# Patient Record
Sex: FEMALE
[De-identification: names, ages, dates, MRNs, and addresses within clinical notes are randomized per-mention and may not be internally consistent; named-entity substitution may affect disease eponyms.]

---
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
## 2019-07-20 ENCOUNTER — HOSPITAL ENCOUNTER (INPATIENT)
Dept: HOSPITAL 25 - ED | Age: 19
LOS: 20 days | Discharge: HOME | DRG: 750 | End: 2019-08-09
Attending: PSYCHIATRY & NEUROLOGY | Admitting: PSYCHIATRY & NEUROLOGY
Payer: MEDICAID

## 2019-07-20 DIAGNOSIS — Z81.8: ICD-10-CM

## 2019-07-20 DIAGNOSIS — F20.9: Primary | ICD-10-CM

## 2019-07-20 DIAGNOSIS — Z78.1: ICD-10-CM

## 2019-07-20 DIAGNOSIS — Z62.812: ICD-10-CM

## 2019-07-20 PROCEDURE — 81015 MICROSCOPIC EXAM OF URINE: CPT

## 2019-07-20 PROCEDURE — 80053 COMPREHEN METABOLIC PANEL: CPT

## 2019-07-20 PROCEDURE — 84443 ASSAY THYROID STIM HORMONE: CPT

## 2019-07-20 PROCEDURE — 99232 SBSQ HOSP IP/OBS MODERATE 35: CPT

## 2019-07-20 PROCEDURE — 99222 1ST HOSP IP/OBS MODERATE 55: CPT

## 2019-07-20 PROCEDURE — 83036 HEMOGLOBIN GLYCOSYLATED A1C: CPT

## 2019-07-20 PROCEDURE — 81003 URINALYSIS AUTO W/O SCOPE: CPT

## 2019-07-20 PROCEDURE — 99233 SBSQ HOSP IP/OBS HIGH 50: CPT

## 2019-07-20 PROCEDURE — 99285 EMERGENCY DEPT VISIT HI MDM: CPT

## 2019-07-20 PROCEDURE — 85025 COMPLETE CBC W/AUTO DIFF WBC: CPT

## 2019-07-20 PROCEDURE — 99238 HOSP IP/OBS DSCHRG MGMT 30/<: CPT

## 2019-07-20 PROCEDURE — 84702 CHORIONIC GONADOTROPIN TEST: CPT

## 2019-07-20 PROCEDURE — 36415 COLL VENOUS BLD VENIPUNCTURE: CPT

## 2019-07-20 PROCEDURE — 80307 DRUG TEST PRSMV CHEM ANLYZR: CPT

## 2019-07-20 PROCEDURE — 80061 LIPID PANEL: CPT

## 2019-07-20 PROCEDURE — 99231 SBSQ HOSP IP/OBS SF/LOW 25: CPT

## 2019-07-20 NOTE — ED
Psychiatric Complaint





- HPI Summary


HPI Summary: 


This patient is a 18 year old female presenting to [facility] with a chief complaint 

of altered mental status reported by her aunt and uncle accompanying her. They 

state that she is not acting her normal self. They state the patient is acting 

confused and not responding appropriately. They state the pt is fearful of 

demons. They also state that pt has not been sleeping. The patient is 

uncooperative in the ED and keeps asking how long she will be here. She is 

being seen in the conference room and refuses to enter the patient room in the 

main ED. She says "I want water here" in the waiting room and does not want to 

go to the patient room with water. She has ripped off her name benedicto 

multiple times. When explaining simple directives she states "What are you 

talking about?". Patient "wasn't ready" and resisted going to her room. The 

patient denies any medical or surgical Hx. Her aunt states she states she was 

talking non-stop last night, and states there was a demon and she was trying to 

get it out. She repeatedly was stating "I am not going to kill myself". She was 

referring to herself as "Us". Patient became agitated. Two nurses, two security 

members, an ED tech, and Dr. Cardona all attempted to get pt to the ED room. Pt 

attempted to elope several times. Pt was aggressive and calling staff names. Pt 

was carried to the ED room, where she was medicated for her safety and staff's 

safety. Pt's aunt states the patient's mother has a Hx of schizophrenia but the 

patient has never had an episode before. Patient will be placed on 1:1 arm's 

reach watch. Patient is an elopement risk. 








LEVEL 5 Caveat: HPI Limited due to patient altered mental status. 





- History Of Current Complaint


Chief Complaint: EDMentalHealth


Hx Obtained From: Patient, Family/Caretaker - aunt and uncle


Onset/Duration: Gradual Onset


Timing: Constant


Severity Initially: Severe


Severity Currently: Severe


Character: Manic, Fearful, Anxious


Aggravating Factor(s): Recent Stress - relationship break up


Alleviating Factor(s): Nothing


Associated Signs And Symptoms: Positive: Hostile - at times to staff, Confused, 

Hallucinating - seeing demons





- Allergies/Home Medications


Allergies/Adverse Reactions: 


 Allergies











Allergy/AdvReac Type Severity Reaction Status Date / Time


 


No Known Allergies Allergy   Verified 07/20/19 08:16














PMH/Surg Hx/FS Hx/Imm Hx


Previously Healthy: Yes





- Surgical History


Surgical History: None


Infectious Disease History: No


Infectious Disease History: 


   Denies: Traveled Outside the [place] in Last 30 Days





- Family History


Known Family History: Positive: Other - Schizophrenia, mother


   Negative: Cardiac Disease, Hypertension, Diabetes





- Social History


Alcohol Use: None


Substance Use Type: Reports: None


Smoking Status (MU): Never Smoked Tobacco





- Additional Comments


History Additional Comments: 





LEVEL 5 Caveat: PMH Limited due to patient altered mental status. 





Review of Systems


Constitutional: Negative


Cardiovascular: Negative


Respiratory: Negative


Gastrointestinal: Negative


Positive: no symptoms reported


Skin: Negative


Neurological: Negative


Positive: Other - agitated, asks repeated questions, attempts to elope, 


All Other Systems Reviewed And Are Negative: No





- Comments


Additional Review of Systems Comments: 





LEVEL 5 Caveat: ROS Limited due to patient altered mental status. 





Physical Exam





- Summary


Physical Exam Summary: 


Appearance: Well-appearing, no pain distress, well-nourished


Skin: Warm, color reflects adequate perfusion, dry


Head: Normal Head/Face inspection, atraumatic


Eyes: Conjunctiva clear, pupils midpoint, EOMI, no nystagmus


ENT: Normal inspection


Neck: Supple, no nodes, no JVD


Respiratory: Lungs clear, normal breath sounds, no respiratory distress


Cardio: RRR, No murmur, pulses normal, brisk capillary refill


Abdomen: Soft, nontender


Bowel sounds: Present 


Musculoskeletal: Strength Intact/ROM intact, no calf tenderness, no edema.


Psychological: Poor eye contact, asks repetitive questions. 


Neuro: Alert, muscle tone normal, no focal deficit, normal gait 








Triage Information Reviewed: Yes


Vital Signs On Initial Exam: 


 Initial Vitals











Temp Pulse Resp BP Pulse Ox


 


 99.0 F   100   16   130/88   99 


 


 07/20/19 08:10  07/20/19 08:10  07/20/19 08:10  07/20/19 08:10  07/20/19 08:10











Vital Signs Reviewed: Yes


Completion Of Physical Exam Limited Due To: Altered Mental Status, Level 5





Diagnostics





- Vital Signs


 Vital Signs











  Temp Pulse Resp BP Pulse Ox


 


 07/20/19 08:10  99.0 F  100  16  130/88  99














- Laboratory


Result Diagrams: 


 07/27/19 08:32





 07/27/19 08:32


Lab Statement: Any lab studies that have been ordered have been reviewed, and 

results considered in the medical decision making process.





Re-Evaluation





- Re-Evaluation


  ** First Eval


Re-Evaluation Time: 09:30


Change: Unchanged


Comment: Pt given Geodon 20mg and Ativan 2mg IM for combative behavior, 

attempting to jump out of bed.





  ** Second Eval


Re-Evaluation Time: 10:10


Change: Improved


Comment: Pt sleeping and appears to be in no distress. 1:1 arm's reach watch 

remains in place.





Course/Dx





- Course


Course Of Treatment: This patient is a 18 year old female presenting to [facility] 

with a chief complaint of altered mental status per her aunt and uncle. Pt hasn'

t slept in days and is fearful of demons. Pt was disruptive, aggressive and 

uncooperative in the ED, requiring several staff members and Dr. Cardona to keep 

her safe, and keep her from eloping. Pt was given Geodon 20mg IM and Lorazepam 

2mg IM and ultimately fell asleep. Mental health evaluation was completed by 

JEWELL Horn to the best of her ability, who discussed pt with Dr. Evans. At 0959 

papers signed for 9.39 admission, involutary, per Dr. Evans with a diagnosis 

of mood disorder and acute psychosis.  Note: for pt and staff safety, pt was 

taken directly to [facility] prior to labs being drawn, so the lab statement should 

read that "any labs that are resulted (not ordered) have been reviewed." In 

this young, otherwise healthy woman, no labs were considered necessary in order 

to medically clear her to go the the U.





- Differential Dx/Clinical Impression


Differential Diagnosis/HQI/PQRI: Positive: Acute Psychosis, Bipolar Disorder, 

Schizophrenia


Provider Diagnosis: 


 Mood disorder, Acute psychosis








- Physician Notifications


Discussed Care Of Patient With: Becky Evans - fidel Horn RN


Instructed by Provider To: Admit As Inpatient - 9.39





- Critical Care Time


Critical Care Time: 30-74 min - 45 minutes





Discharge ED





- Sign-Out/Discharge


Documenting (check all that apply): Patient Departure - Admission to [facility]





- Discharge Plan


Condition: Improved


Disposition: PSYCHIATRIC FACILITY[place] [place]





- Billing Disposition and Condition


Condition: IMPROVED


Disposition: Psychiatric Facility [facility] [place]





- Attestation Statements


Document Initiated by Scribe: Yes


Documenting Scribe: Vineet Lechuga


Provider For Whom Scribe is Documenting (Include Credential): Milli Cardona MD


Scribe Attestation: 


IVineet, scribed for Milli Cardona MD on 08/20/19 at 2134. 


Scribe Documentation Reviewed: Yes


Provider Attestation: 


The documentation as recorded by the cliftonibVineet phillips accurately 

reflects the service I personally performed and the decisions made by me, 

Milli Cardona MD


Status of Scribe Document: Viewed

## 2019-07-20 NOTE — HP
HISTORY AND PHYSICAL:

 

DATE OF ADMISSION:  19

 

IDENTIFYING DATA:  Lauren is a 18-year-old single  female, [facility] was brought into the emergency room by her aunt and aunt's  due to sudden change in 
her mental status.

 

CHIEF COMPLAINT:  "The patient is unable to verbalize anything due to deep sedation; however, she was
 extremely agitated, paranoid, and aggressive in the emergency room."

 

HISTORY OF PRESENT ILLNESS:  This 18-year-old female with no known history of treatment in the past c
amaury to the emergency room accompanied by her aunt and uncle due to a sudden change in her mental stat
us ranging from being confused, agitated to aggressive.  At the time of evaluation, she was extremely
 sedated from her stat medication that she received in the emergency room, but the review of ED repor
t indicates that the patient was acting confused on responding to some unseen stimuli.  She was very 
fearful to come into the emergency room, in her room rather stayed outside in the waiting area.  She 
was very fearful to go inside because of the demons in there.  Her aunt and uncle also reported the s
amaury thing that for the last 3 nights Lauren was not sleeping, rather fighting with the demons and tryi
ng to get them out of the house.  She was doing exactly the same thing in the emergency room.  At one
 point, ED doctor had to give her 2 mg of lorazepam and 20 of Geodon IM to control her agitation and 
aggression.  At this point, she is deeply sedated. We did try to get her vital signs, but she would n
ot allow.  Finally, the vital signs were taken, which appears to be low blood pressure.  Her blood pr
essure at this time is 83/58; pulse rate varies from 75 to 110, breathing shallow, but normal; temper
ature 98.2; O2 sat 99% on room air.  Hospitalist, Dr. Beverly, was called.  He advised to repeat the v
ital signs, which we did and did not have much difference except for her pulse rate went up.  I also 
called the lab to see if they have the blood.  They said they did not have the blood, so they are on 
their way to obtain blood and urine and other labs that were ordered in the emergency room. According
 to the patient's aunt and uncle, Lauren broke up with her boyfriend of 3 years and moved out of the a
partment in the recent past and since then there was a change in her mental status.  Rest of the hist
ory is unavailable because the patient is unable to provide any history and there are no family membe
rs available; however, collateral information from aunt and uncle indicates that a month ago Lauren sa
id that she feels spirits around her and  relatives as well.  Other than that, there is not m
uch of the psychiatric history.

 

FAMILY HISTORY:  Again, from collaterals is that the patient's mother has a history of schizophrenia 
and has been hospitalized for the same reason at [facility] for 3 months.  She was als
o admitted in other hospitals that we do not know.  She was once hospitalized in [place] where she im
migrated for 10 years.  Lauren's father had anger management problem and marital discord when Lauren an
d her younger sister were younger.  The relationship has been estranged until recently when they bega
n speaking again.

 

PSYCHOSOCIAL HISTORY:  The patient immigrated to [place] 10 years ago with her family.  The patient a
nd her sister were removed from their parents' house when they were younger.  Per aunt, Lauren was queenie
luca with her and her , Favian and the sister, Rosy, resided with her biological father besocrates
ore DSS removed them from the house due to neglect from the mother due to her own mental health probl
ems.  aLuren's education, she graduated from [employer] and then became gainfully employed as 
a  and .  She is currently employed by [employer], a catering job.

 

IMPRESSION AND PLAN:  Again, these are all collateral information and her attending psychiatrist or s
omeone on the team will have to obtain detailed information when the patient is capable of providing 
it.  At this time, my decision is to continue her in the inpatient setting, continue her on one-on-on
e status and keep working with the hospitalist to tackle her low blood pressure, which could be relat
ed to stat medication in the emergency room.  Rest of the treatment will be on as-needed basis and wi
ll be deferred to her assigned attending on the unit.  In the meantime, we will provide her with supp
ortive milieu, individual, and group therapy as she tolerates.  Her code status will remain full.

 

 

 

[id number]/CPS #: [id number]

## 2019-07-21 RX ADMIN — THERA TABS SCH: TAB at 08:53

## 2019-07-22 RX ADMIN — THERA TABS SCH: TAB at 10:29

## 2019-07-22 RX ADMIN — OLANZAPINE SCH MG: 10 TABLET ORAL at 21:36

## 2019-07-22 NOTE — PN
Subjective





- Subjective


Date of Service: 07/22/19


Service Type: 60936 Hosp care 35 min high complexity


Subjective: 





This is the first time I meet with Lauren. She is psychotic: hallucinating words 

in her head that she is not sure if she really spoke and is delusional, 

believing that she will die, that we might kill her, and that she is behaving 

like a bad person. She, this morning, was terrified and anxious. She stayed in 

her room for the most part and paced around it bouncing a ball. She was given 

Zyprexa 5 mg x2 and didn't sleep, but did calm down and felt more "like myself.

"



Estephania Oliveros, LCSRENU, and I meet with Lauren and two of her aunts. Lauren paces 

around when she can no longer tolerate talking. She skips at times and then 

returns to the table. She writes notes to herself in her book that are 

affirming and supportive, although the statements are rambling; nevertheless, 

they are positive and she recognizes them to be so. Her aunts are quite upset 

as this is not how Lauren typically behaves. They do not know how long she has 

been decompensating, but notes from her journal indicate that it's been at 

least a month.





Her mother, Helga, also visited and was concerned for her wellbeing.





Objective





- General Observations


Appearance: Disheveled


Appears Stated Age: Yes


Stature: WNL


Posture: WNL


Eye Contact: Intermittent


Behavior/Activity: Accelerated, Peculiar, Impulsive, Agitated





- Interaction Observations


Attitude Towards Examiner: Confused, Mistrustful


Stated Mood: Dysphoric, Anxious


Affect: Labile


Speech Pattern/Tone: Clear


Thought Process: Disorganized, Tangential, Filght of Ideas


Perception: WNL


Thought Content: Preoccupation/Ruminations, Paranoid


Hallucination Type: Auditory


Delusion Type: Thought Broadcasting, Reference, Somatic





- Cognitive Function


Orientation: Person, Place, Time


Level of Consciousness: Awake, Alert


Cognition: Impaired Attention/Concentration, Impaired Ability to Abstract


Estimated Intelligence: Normal


Insight: Difficulty Acknowledging Presence of Psyciatric Problems


Judgment Within Normal Limits: No


Ability to Make Reasonable Decisions: Serverely Impaired





- Medication Compliance


Cooperative with Inpatient Medication Regimen: Yes





- Group Participation


Participates in Group Activities: Partial





Assessment





- Assessment


Merits Inpatient Hospitalization: For Immediate Safety


Clinical Impression: 





Lauren is a 18-year-old single white woman with no history of psychiatric 

problems but is clearly psychotic at this time who came to the hospital after 

presenting to her aunt and uncle in a fashion she never has before: speaking 

nonsense and screaming "negative" things.





Plan





- Plan


Treatment Plan: 


Name: LAUREN FISH                        


YOB: 2000                        


[id number]


[id number]








We will start Zyprexa for now and potentially transition her to Abilify 

injection in the future.


The priority now is to get her rest and reduce the psychosis.


We will continue to monitor her mood and behavior and work with her family.


Continued Medication Management: Different Medication


Medications: 


 Current Medications





Acetaminophen (Tylenol Tab*)  650 mg PO Q4H PRN


   PRN Reason: PAIN or TEMP > 101 F


Al Hydrox/Mg Hydrox/Simethicone (Maalox Plus*)  30 ml PO Q4H PRN


   PRN Reason: INDIGESTION


Multivitamins (Theragran Tab*)  1 tab PO DAILY [facility]


   Last Admin: 07/22/19 10:29 Dose:  Not Given


Olanzapine (Zyprexa Tab*)  10 mg PO BEDTIME [facility]IM[facility]


Olanzapine (Zyprexa Tab*)  5 mg PO Q6H DIMAS











- Discharge Plan


Discharge Plan: Outpatient Follow Up

## 2019-07-23 RX ADMIN — OLANZAPINE SCH: 10 TABLET ORAL at 03:34

## 2019-07-23 RX ADMIN — OLANZAPINE SCH MG: 5 TABLET, FILM COATED ORAL at 11:05

## 2019-07-23 RX ADMIN — THERA TABS SCH: TAB at 08:38

## 2019-07-23 RX ADMIN — OLANZAPINE SCH: 5 TABLET, FILM COATED ORAL at 03:37

## 2019-07-23 RX ADMIN — OLANZAPINE SCH: 5 TABLET, FILM COATED ORAL at 03:36

## 2019-07-23 RX ADMIN — OLANZAPINE SCH: 10 TABLET ORAL at 22:00

## 2019-07-23 RX ADMIN — OLANZAPINE SCH: 5 TABLET, FILM COATED ORAL at 08:38

## 2019-07-23 NOTE — PN
Subjective





- Subjective


Date of Service: 07/23/19


Service Type: 25984 Hosp care 25 min moderate complexity


Subjective: 





Lauren has been in better control today, not talking about demons and seemingly 

free of hallucinations. She is no longer willing to take medication, however, 

and that is problematic. Lauren has responded well to Zyprexa. She has taken 3-4 

doses of it in either 5 or 10 mg doses. She did sleep 7 hours last night and 

awoke in a more cheerful mood. 





She is manic, not being able to sit for long periods of time and skipping 

around the room from time to time. She has no insight into the bizarre nature 

of her behavior or beliefs and is defensive when discussing treatment. She is 

not enthusiastic about being in the hospital, stating that she doesn't feel 

safe here, while at the same time not being motivated enough by potential 

discharge to take ordered medications.





Treatment over objection must be considered.








Objective





- General Observations


Appearance: Disheveled


Appears Stated Age: Yes


Stature: WNL


Posture: WNL


Eye Contact: Intense


Behavior/Activity: Accelerated, Peculiar, Impulsive





- Interaction Observations


Attitude Towards Examiner: Confused, Mistrustful


Affect: Labile


Speech Pattern/Tone: Clear, Rambling


Thought Process: Disorganized, Circumstantial


Perception: WNL


Thought Content: Preoccupation/Ruminations, Paranoid


Hallucination Type: Denies, Auditory


Delusion Type: Denies, Persecution, Somatic





- Cognitive Function


Orientation: Person, Place, Time


Level of Consciousness: Awake, Alert, Appropriate


Cognition: Impaired Cognition, Impaired Attention/Concentration


Estimated Intelligence: Normal


Insight: Difficulty Acknowledging Presence of Psyciatric Problems


Judgment Within Normal Limits: No


Ability to Make Reasonable Decisions: Serverely Impaired





- Medication Compliance


Cooperative with Inpatient Medication Regimen: No





- Group Participation


Participates in Group Activities: Partial





Assessment





- Assessment


Merits Inpatient Hospitalization: For Immediate Safety


Clinical Impression: 





Lauren is a 18-year-old single white woman with no history of psychiatric 

problems but is clearly psychotic at this time who came to the hospital after 

presenting to her aunt and uncle in a fashion she never has before: speaking 

nonsense and screaming "negative" things.





Plan





- Plan


Treatment Plan: 


Name: LAUREN FISH                        


YOB: 2000                        


[id number]


[id number]








We will start Zyprexa for now and potentially transition her to Abilify 

injection in the future.


The priority now is to get her rest and reduce the psychosis.


We will continue to monitor her mood and behavior and work with her family.


7/23/19


Increase dose of PRN Zyprexa and add Ativan to PRNs.





Continued Medication Management: Different Medication


Medications: 


 Current Medications





Acetaminophen (Tylenol Tab*)  650 mg PO Q4H PRN


   PRN Reason: PAIN or TEMP > 101 F


Al Hydrox/Mg Hydrox/Simethicone (Maalox Plus*)  30 ml PO Q4H PRN


   PRN Reason: INDIGESTION


Lorazepam (Ativan Tab(*))  1 mg PO Q4H PRN


   PRN Reason: ANXIETY


Multivitamins (Theragran Tab*)  1 tab PO DAILY [facility]


   Last Admin: 07/23/19 08:38 Dose:  Not Given


Olanzapine (Zyprexa Tab*)  10 mg PO BEDTIME DIMAS


   Last Admin: 07/23/19 03:34 Dose:  Not Given


Olanzapine (Zyprexa Tab*)  10 mg PO Q6H PRN


   PRN Reason: AGITATION/ANXIETY

## 2019-07-24 RX ADMIN — OLANZAPINE PRN MG: 10 TABLET ORAL at 09:50

## 2019-07-24 RX ADMIN — THERA TABS SCH: TAB at 08:20

## 2019-07-24 RX ADMIN — LORAZEPAM PRN MG: 1 TABLET ORAL at 16:01

## 2019-07-24 RX ADMIN — OLANZAPINE SCH: 10 TABLET ORAL at 23:00

## 2019-07-24 RX ADMIN — OLANZAPINE PRN MG: 10 TABLET ORAL at 18:47

## 2019-07-24 NOTE — PN
Subjective





- Subjective


Date of Service: 07/24/19


Service Type: 78643 Hosp care 25 min moderate complexity


Subjective: 





Lauren was unable to engage in any productive conversation today. She repeated 

herself over and over and tried to decide if she had sexually assaulted 

herself. She is unable to comprehend answers to her repetitive questions. She 

did receive PRN Zyprexa and a few hours later went to sleep. She continues to 

require constant observation. This morning, she went from laughing and skipping 

to sobbing loudly and curling into a fetal position. She requires nearly 

constant reassurances.





We are pursuing treatment over objection and changing her status to 2PC.





Objective





- General Observations


Appearance: Disheveled


Appears Stated Age: Yes


Stature: WNL


Posture: WNL


Eye Contact: Intense


Behavior/Activity: Peculiar, Impulsive, Agitated





- Interaction Observations


Attitude Towards Examiner: Anxious, Confused, Mistrustful


Stated Mood: Dysphoric, Anxious


Affect: Labile


Speech Pattern/Tone: Clear, Rambling, Excessive, Pressured, Perseverating, Loud 

Volume


Thought Process: Incoherent, Disorganized, Tangential, Racing


Perception: WNL


Thought Content: Paranoid, Phobic


Thought Process: Lethality: Paranoid Ideation


Hallucination Type: Auditory


Delusion Type: Persecution, Reference





- Cognitive Function


Orientation: A&O x 4, Person, Place, Time, Situation


Level of Consciousness: Awake, Alert


Cognition: Impaired Cognition, Impaired Attention/Concentration


Estimated Intelligence: Normal


Insight: Difficulty Acknowledging Presence of Psyciatric Problems


Judgment Within Normal Limits: No


Ability to Make Reasonable Decisions: Serverely Impaired





- Medication Compliance


Cooperative with Inpatient Medication Regimen: No





- Group Participation


Participates in Group Activities: No





Assessment





- Assessment


Merits Inpatient Hospitalization: For Immediate Safety


Clinical Impression: 





Lauren is a 18-year-old single white woman with no history of psychiatric 

problems but is clearly psychotic at this time who came to the hospital after 

presenting to her aunt and uncle in a fashion she never has before: speaking 

nonsense and screaming "negative" things.





Plan





- Plan


Treatment Plan: 


Name: LAUREN FISH                        


YOB: 2000                        


[id number]


[id number]








We will start Zyprexa for now and potentially transition her to Abilify 

injection in the future.


The priority now is to get her rest and reduce the psychosis.


We will continue to monitor her mood and behavior and work with her family.


7/23/19


Increase dose of PRN Zyprexa and add Ativan to PRNs.


7/24/19


Pursue treatment over objection and 2PC status


Continued Medication Management: Different Medication


Medications: 


 Current Medications





Acetaminophen (Tylenol Tab*)  650 mg PO Q4H PRN


   PRN Reason: PAIN or TEMP > 101 F


Al Hydrox/Mg Hydrox/Simethicone (Maalox Plus*)  30 ml PO Q4H PRN


   PRN Reason: INDIGESTION


Lorazepam (Ativan Tab(*))  1 mg PO Q4H PRN


   PRN Reason: ANXIETY


Multivitamins (Theragran Tab*)  1 tab PO DAILY [facility]


   Last Admin: 07/24/19 08:20 Dose:  Not Given


Olanzapine (Zyprexa Tab*)  10 mg PO BEDTIME [facility]IM[facility]


   Last Admin: 07/23/19 22:00 Dose:  Not Given


Olanzapine (Zyprexa Tab*)  10 mg PO Q6H PRN


   PRN Reason: AGITATION/ANXIETY


   Last Admin: 07/24/19 09:50 Dose:  10 mg

## 2019-07-25 RX ADMIN — OLANZAPINE PRN MG: 10 TABLET ORAL at 10:53

## 2019-07-25 RX ADMIN — LORAZEPAM PRN MG: 1 TABLET ORAL at 06:40

## 2019-07-25 RX ADMIN — OLANZAPINE PRN MG: 10 TABLET ORAL at 18:19

## 2019-07-25 RX ADMIN — OLANZAPINE SCH MG: 10 TABLET ORAL at 21:12

## 2019-07-25 RX ADMIN — THERA TABS SCH: TAB at 10:28

## 2019-07-25 NOTE — PN
Subjective





- Subjective


Date of Service: 07/25/19


Service Type: 90216 Hosp care 15 min low complexity


Subjective: 





Lauren spent most of today sleeping. While in many cases this would be 

unreasonable, Lauren had not slept in 36 hours and was disorganized and severely 

distressed due to not being able to believe she was safe in the hospital. Her 

resting will be helpful to her improving.





Treatment over objection hearing has been scheduled for 7/26/19 at 10:30.





Objective





- General Observations


Appearance: Disheveled


Appears Stated Age: Yes


Stature: WNL


Posture: WNL


Behavior/Activity: WNL





- Cognitive Function


Level of Consciousness: Drowsy


Estimated Intelligence: Normal


Insight: Difficulty Acknowledging Presence of Psyciatric Problems





Assessment





- Assessment


Merits Inpatient Hospitalization: For Immediate Safety, Diagnosis Determination


Clinical Impression: 





Lauren is a 18-year-old single white woman with no history of psychiatric 

problems but is clearly psychotic at this time who came to the hospital after 

presenting to her aunt and uncle in a fashion she never has before: speaking 

nonsense and screaming "negative" things. She has remained disorganized and 

unable to be reassured.





Plan





- Plan


Treatment Plan: 


Name: LAUREN FISH                        


YOB: 2000                        


[id number]


[id number]








We will start Zyprexa for now and potentially transition her to Abilify 

injection in the future.


The priority now is to get her rest and reduce the psychosis.


We will continue to monitor her mood and behavior and work with her family.


7/23/19


Increase dose of PRN Zyprexa and add Ativan to PRNs.


7/24/19


Pursue treatment over objection and 2PC status


7/25/19


Add Abilify to morning medications in plan to inject with Aristada should 

treatment over objection be obtained.


Medications: 


 Current Medications





Acetaminophen (Tylenol Tab*)  650 mg PO Q4H PRN


   PRN Reason: PAIN or TEMP > 101 F


Al Hydrox/Mg Hydrox/Simethicone (Maalox Plus*)  30 ml PO Q4H PRN


   PRN Reason: INDIGESTION


Aripiprazole (Abilify  Tab*)  5 mg PO DAILY [facility]


Lorazepam (Ativan Tab(*))  1 mg PO Q4H PRN


   PRN Reason: ANXIETY


   Last Admin: 07/25/19 06:40 Dose:  1 mg


Multivitamins (Theragran Tab*)  1 tab PO DAILY [facility]


   Last Admin: 07/25/19 10:28 Dose:  Not Given


Olanzapine (Zyprexa Tab*)  10 mg PO BEDTIME [facility]


   Last Admin: 07/24/19 23:00 Dose:  Not Given


Olanzapine (Zyprexa Tab*)  10 mg PO Q6H PRN


   PRN Reason: AGITATION/ANXIETY


   Last Admin: 07/25/19 10:53 Dose:  10 mg

## 2019-07-26 RX ADMIN — THERA TABS SCH: TAB at 08:23

## 2019-07-27 LAB
ALBUMIN SERPL BCG-MCNC: 4.5 G/DL (ref 3.2–5.2)
ALBUMIN/GLOB SERPL: 1.6 {RATIO} (ref 1–3)
ALP SERPL-CCNC: 56 U/L (ref 34–104)
ALT SERPL W P-5'-P-CCNC: 13 U/L (ref 7–52)
ANION GAP SERPL CALC-SCNC: 7 MMOL/L (ref 2–11)
AST SERPL-CCNC: 15 U/L (ref 13–39)
BASOPHILS # BLD AUTO: 0.1 10^3/UL (ref 0–0.2)
BUN SERPL-MCNC: 12 MG/DL (ref 6–24)
BUN/CREAT SERPL: 16.4 (ref 8–20)
CALCIUM SERPL-MCNC: 9.4 MG/DL (ref 8.6–10.3)
CHLORIDE SERPL-SCNC: 107 MMOL/L (ref 101–111)
CHOLEST SERPL-MCNC: 133 MG/DL
EOSINOPHIL # BLD AUTO: 0.1 10^3/UL (ref 0–0.6)
GLOBULIN SER CALC-MCNC: 2.8 G/DL (ref 2–4)
GLUCOSE SERPL-MCNC: 103 MG/DL (ref 70–100)
HCG SERPL QL: < 0.6 MIU/ML
HCO3 SERPL-SCNC: 25 MMOL/L (ref 22–32)
HCT VFR BLD AUTO: 40 % (ref 35–47)
HDLC SERPL-MCNC: 65 MG/DL
HGB BLD-MCNC: 13.2 G/DL (ref 12–16)
LYMPHOCYTES # BLD AUTO: 1.8 10^3/UL (ref 1–4.8)
MCH RBC QN AUTO: 27 PG (ref 27–31)
MCHC RBC AUTO-ENTMCNC: 33 G/DL (ref 31–36)
MCV RBC AUTO: 81 FL (ref 80–97)
MONOCYTES # BLD AUTO: 0.3 10^3/UL (ref 0–0.8)
NEUTROPHILS # BLD AUTO: 6.6 10^3/UL (ref 1.5–7.7)
NRBC # BLD AUTO: 0 10^3/UL
NRBC BLD QL AUTO: 0
PLATELET # BLD AUTO: 312 10^3/UL (ref 150–450)
POTASSIUM SERPL-SCNC: 3.9 MMOL/L (ref 3.5–5)
PROT SERPL-MCNC: 7.3 G/DL (ref 6.4–8.9)
RBC # BLD AUTO: 4.94 10^6 /UL (ref 3.7–4.87)
SODIUM SERPL-SCNC: 139 MMOL/L (ref 135–145)
TRIGL SERPL-MCNC: 51 MG/DL
TSH SERPL-ACNC: 0.81 MCIU/ML (ref 0.34–5.6)
WBC # BLD AUTO: 8.9 10^3/UL (ref 3.5–10.8)

## 2019-07-27 RX ADMIN — OLANZAPINE PRN MG: 10 TABLET ORAL at 15:54

## 2019-07-27 RX ADMIN — ARIPIPRAZOLE ONE: 15 TABLET ORAL at 11:49

## 2019-07-27 RX ADMIN — ARIPIPRAZOLE ONE MG: 15 TABLET ORAL at 13:06

## 2019-07-27 RX ADMIN — LORAZEPAM PRN MG: 1 TABLET ORAL at 17:59

## 2019-07-27 RX ADMIN — THERA TABS SCH: TAB at 14:18

## 2019-07-28 RX ADMIN — LORAZEPAM PRN MG: 1 TABLET ORAL at 18:42

## 2019-07-28 RX ADMIN — ACETAMINOPHEN PRN MG: 325 TABLET ORAL at 21:27

## 2019-07-28 RX ADMIN — ACETAMINOPHEN PRN MG: 325 TABLET ORAL at 11:06

## 2019-07-28 RX ADMIN — THERA TABS SCH TAB: TAB at 08:12

## 2019-07-28 RX ADMIN — LORAZEPAM PRN MG: 1 TABLET ORAL at 07:38

## 2019-07-29 RX ADMIN — LORAZEPAM SCH: 1 TABLET ORAL at 22:21

## 2019-07-29 RX ADMIN — LORAZEPAM SCH MG: 1 TABLET ORAL at 17:17

## 2019-07-29 RX ADMIN — THERA TABS SCH: TAB at 09:28

## 2019-07-29 RX ADMIN — LORAZEPAM PRN MG: 1 TABLET ORAL at 11:59

## 2019-07-29 NOTE — PN
Subjective





- Subjective


Date of Service: 07/29/19


Service Type: [id number] Hosp care 25 min moderate complexity


Subjective: 





Lauren states she's feeling much better. She acknowledges that she is emotional 

and stressed. She is reminded that she is emotionally labile and frightened and 

that is the sign of illness. She advocates for herself that she wants to go 

outside. She also mentions, however, that she has a sense that someone in the 

hospital is trying to kill her.





She requests someone to reassure her constantly that she's safe. She would like 

to have a 1:1 again, but that is not practical or appropriate.





She is improving, although not as quickly as would be hoped.





Objective





- General Observations


Appearance: Disheveled


Appears Stated Age: Yes


Stature: WNL


Posture: WNL


Eye Contact: Intense


Behavior/Activity: Peculiar





- Interaction Observations


Attitude Towards Examiner: Cooperative, Anxious, Confused


Stated Mood: Dysphoric, Anxious


Affect: Labile


Speech Pattern/Tone: Clear, Rambling, Excessive


Thought Process: Disorganized


Perception: WNL


Thought Content: Preoccupation/Ruminations, Paranoid


Hallucination Type: Denies


Delusion Type: Persecution





- Cognitive Function


Orientation: A&O x 4


Level of Consciousness: Awake, Alert, Appropriate


Cognition: Impaired Cognition, Impaired Attention/Concentration


Estimated Intelligence: Normal


Insight: Difficulty Acknowledging Presence of Psyciatric Problems


Judgment Within Normal Limits: No


Ability to Make Reasonable Decisions: Moderately Impaired





- Medication Compliance


Cooperative with Inpatient Medication Regimen: Yes





- Group Participation


Participates in Group Activities: Partial





Assessment





- Assessment


Merits Inpatient Hospitalization: For Immediate Safety


Clinical Impression: 





Lauren is a 18-year-old single white woman with no history of psychiatric 

problems but is clearly psychotic at this time who came to the hospital after 

presenting to her aunt and uncle in a fashion she never has before: speaking 

nonsense and screaming "negative" things. She has remained disorganized and 

unable to be reassured.





Plan





- Plan


Treatment Plan: 


Name: LAUREN FISH                        


YOB: 2000                        


[id number]


[id number]








We will start Zyprexa for now and potentially transition her to Abilify 

injection in the future.


The priority now is to get her rest and reduce the psychosis.


We will continue to monitor her mood and behavior and work with her family.


7/23/19


Increase dose of PRN Zyprexa and add Ativan to PRNs.


7/24/19


Pursue treatment over objection and 2PC status


7/25/19


Add Abilify to morning medications in plan to inject with Aristada should 

treatment over objection be obtained.


7/26/19


Use TOO to administer Initio, Aristada 882, and 30 mg of aripiprazole. Obtain 

pregnancy test before administration of aripiprazole products.


7/29/19


Aristada Initio, 882, and 30 mg of aripiprazole oral were administered over the 

weekend. We are now waiting for improvement.


Continued Medication Management: Different Medication


Medications: 


 Current Medications





Acetaminophen (Tylenol Tab*)  650 mg PO Q4H PRN


   PRN Reason: PAIN or TEMP > 101 F


   Last Admin: 07/28/19 21:27 Dose:  650 mg


Al Hydrox/Mg Hydrox/Simethicone (Maalox Plus*)  30 ml PO Q4H PRN


   PRN Reason: INDIGESTION


Lorazepam (Ativan Tab(*))  1 mg PO Q4H PRN


   PRN Reason: ANXIETY


   Last Admin: 07/29/19 11:59 Dose:  1 mg


Multivitamins (Theragran Tab*)  1 tab PO DAILY [facility]


   Last Admin: 07/29/19 09:28 Dose:  Not Given


Olanzapine (Zyprexa Tab*)  10 mg PO Q6H PRN


   PRN Reason: AGITATION/ANXIETY


   Last Admin: 07/27/19 15:54 Dose:  10 mg

## 2019-07-30 RX ADMIN — THERA TABS SCH TAB: TAB at 08:14

## 2019-07-30 RX ADMIN — LORAZEPAM SCH MG: 1 TABLET ORAL at 08:14

## 2019-07-30 RX ADMIN — LORAZEPAM SCH: 1 TABLET ORAL at 22:38

## 2019-07-30 RX ADMIN — LORAZEPAM SCH MG: 1 TABLET ORAL at 16:56

## 2019-07-30 RX ADMIN — LORAZEPAM SCH MG: 1 TABLET ORAL at 13:10

## 2019-07-31 RX ADMIN — LORAZEPAM SCH MG: 1 TABLET ORAL at 09:24

## 2019-07-31 RX ADMIN — THERA TABS SCH TAB: TAB at 09:24

## 2019-08-01 RX ADMIN — OLANZAPINE SCH: 10 TABLET ORAL at 14:59

## 2019-08-01 RX ADMIN — THERA TABS SCH: TAB at 10:52

## 2019-08-01 RX ADMIN — DOCUSATE SODIUM SCH: 100 CAPSULE, LIQUID FILLED ORAL at 21:00

## 2019-08-01 RX ADMIN — OLANZAPINE PRN MG: 10 TABLET ORAL at 13:08

## 2019-08-01 RX ADMIN — OLANZAPINE SCH: 10 TABLET ORAL at 21:01

## 2019-08-02 RX ADMIN — OLANZAPINE SCH MG: 10 TABLET, ORALLY DISINTEGRATING ORAL at 10:32

## 2019-08-02 RX ADMIN — OLANZAPINE SCH: 10 TABLET ORAL at 08:53

## 2019-08-02 RX ADMIN — THERA TABS SCH: TAB at 08:53

## 2019-08-02 RX ADMIN — DOCUSATE SODIUM SCH: 100 CAPSULE, LIQUID FILLED ORAL at 21:43

## 2019-08-02 RX ADMIN — DOCUSATE SODIUM SCH: 100 CAPSULE, LIQUID FILLED ORAL at 08:53

## 2019-08-02 RX ADMIN — OLANZAPINE SCH MG: 10 TABLET, ORALLY DISINTEGRATING ORAL at 21:43

## 2019-08-02 NOTE — PN
Subjective





- Subjective


Date of Service: 08/02/19


Service Type: 30436 Hosp care 25 min moderate complexity


Subjective: 





Lauren and I spent time with her family reviewing the events of her stay on the 

[facility]. She referred some of the events that frightened her, such as being held 

down and given injections of medications. Lauren revealed that this was very 

traumatic for her and she interpreted the behavior as being assaultive and 

threatening. She asserted that she wished she had been told about what was 

going to happen. We talked about her lack of memory and her misinterpretation 

of information. We discussed that one thing we needed to see from her was he 

ability to go on staff pass without high reactivity and misinterpretation of 

staff motives. Her insight is improving, but she continues to not be well.





Objective





- General Observations


Appearance: Neat


Appears Stated Age: Yes


Stature: WNL


Posture: WNL


Eye Contact: Intermittent


Behavior/Activity: Peculiar, Impulsive





- Interaction Observations


Attitude Towards Examiner: Cooperative, Anxious, Confused


Stated Mood: Dysphoric, Anxious


Affect: Labile


Speech Pattern/Tone: Clear


Thought Process: Circumstantial


Perception: WNL


Thought Content: Paranoid, Phobic


Thought Process: Lethality: Paranoid Ideation


Hallucination Type: None


Delusion Type: Denies, Persecution





- Cognitive Function


Orientation: A&O x 4


Level of Consciousness: Awake, Alert, Appropriate


Cognition: Impaired Attention/Concentration, Impaired Ability to Abstract


Estimated Intelligence: Normal


Insight: Difficulty Acknowledging Presence of Psyciatric Problems


Ability to Make Reasonable Decisions: Serverely Impaired





- Medication Compliance


Cooperative with Inpatient Medication Regimen: Yes





- Group Participation


Participates in Group Activities: Partial





Assessment





- Assessment


Clinical Impression: 





Lauren is a 18-year-old single white woman with no history of psychiatric 

problems but is clearly psychotic at this time who came to the hospital after 

presenting to her aunt and uncle in a fashion she never has before: speaking 

nonsense and screaming "negative" things. She has remained disorganized and 

unable to be reassured.





Plan





- Plan


Treatment Plan: 


Name: LAUREN FISH                        


YOB: 2000                        


[id number]


[id number]








We will start Zyprexa for now and potentially transition her to Abilify 

injection in the future.


The priority now is to get her rest and reduce the psychosis.


We will continue to monitor her mood and behavior and work with her family.


7/23/19


Increase dose of PRN Zyprexa and add Ativan to PRNs.


7/24/19


Pursue treatment over objection and 2PC status


7/25/19


Add Abilify to morning medications in plan to inject with Aristada should 

treatment over objection be obtained.


7/26/19


Use TOO to administer Initio, Aristada 882, and 30 mg of aripiprazole. Obtain 

pregnancy test before administration of aripiprazole products.


7/29/19


Aristada Initio, 882, and 30 mg of aripiprazole oral were administered over the 

weekend. We are now waiting for improvement.


8/2/19


As Lauren has improved at a very slow and incomplete pace, we began Zyprexa 10 

mg BID orally. She is beginning to improve more rapidly. Lauren is eager to 

leave and we have targeted a discharge date of Thursday.


Continued Medication Management: Different Medication


Medications: 


 Current Medications





Acetaminophen (Tylenol Tab*)  650 mg PO Q4H PRN


   PRN Reason: PAIN or TEMP > 101 F


   Last Admin: 07/28/19 21:27 Dose:  650 mg


Al Hydrox/Mg Hydrox/Simethicone (Maalox Plus*)  30 ml PO Q4H PRN


   PRN Reason: INDIGESTION


Docusate Sodium (Colace Cap*)  100 mg PO BID [facility]


   Last Admin: 08/02/19 08:53 Dose:  Not Given


Lorazepam (Ativan Tab(*))  2 mg PO Q4H PRN


   PRN Reason: Anxiety/agitation


Multivitamins (Theragran Tab*)  1 tab PO DAILY [facility]


   Last Admin: 08/02/19 08:53 Dose:  Not Given


Olanzapine (Zyprexa * Tab **Odt***)  5 mg PO Q4H PRN


   PRN Reason: AGITATION/ANXIETY


   Last Admin: 08/01/19 16:12 Dose:  5 mg


Olanzapine (Zyprexa *Odt*)  10 mg PO BID [facility]


   Last Admin: 08/02/19 10:32 Dose:  10 mg

## 2019-08-03 RX ADMIN — OLANZAPINE SCH MG: 10 TABLET, ORALLY DISINTEGRATING ORAL at 20:48

## 2019-08-03 RX ADMIN — DOCUSATE SODIUM SCH: 100 CAPSULE, LIQUID FILLED ORAL at 09:02

## 2019-08-03 RX ADMIN — ACETAMINOPHEN PRN MG: 325 TABLET ORAL at 13:25

## 2019-08-03 RX ADMIN — OLANZAPINE SCH MG: 10 TABLET, ORALLY DISINTEGRATING ORAL at 09:01

## 2019-08-03 RX ADMIN — DOCUSATE SODIUM SCH: 100 CAPSULE, LIQUID FILLED ORAL at 20:48

## 2019-08-03 RX ADMIN — THERA TABS SCH: TAB at 09:02

## 2019-08-04 RX ADMIN — THERA TABS SCH: TAB at 09:09

## 2019-08-04 RX ADMIN — DOCUSATE SODIUM SCH: 100 CAPSULE, LIQUID FILLED ORAL at 20:42

## 2019-08-04 RX ADMIN — DOCUSATE SODIUM SCH MG: 100 CAPSULE, LIQUID FILLED ORAL at 10:59

## 2019-08-04 RX ADMIN — DOCUSATE SODIUM SCH: 100 CAPSULE, LIQUID FILLED ORAL at 09:09

## 2019-08-04 RX ADMIN — OLANZAPINE SCH MG: 10 TABLET, ORALLY DISINTEGRATING ORAL at 20:40

## 2019-08-04 RX ADMIN — OLANZAPINE SCH MG: 10 TABLET, ORALLY DISINTEGRATING ORAL at 09:08

## 2019-08-05 RX ADMIN — OLANZAPINE SCH MG: 10 TABLET, ORALLY DISINTEGRATING ORAL at 08:24

## 2019-08-05 RX ADMIN — DOCUSATE SODIUM SCH: 100 CAPSULE, LIQUID FILLED ORAL at 20:49

## 2019-08-05 RX ADMIN — OLANZAPINE SCH MG: 10 TABLET, ORALLY DISINTEGRATING ORAL at 20:49

## 2019-08-05 RX ADMIN — DOCUSATE SODIUM SCH: 100 CAPSULE, LIQUID FILLED ORAL at 08:25

## 2019-08-05 RX ADMIN — THERA TABS SCH: TAB at 08:25

## 2019-08-05 NOTE — PN
Subjective





- Subjective


Date of Service: 08/05/19


Service Type: [id number] Hosp care 35 min high complexity


Subjective: 





I spoke at length with Lauren today. She has improved significantly since Friday

, but it is becoming clear that she will not be continuing her medications 

outpatient as they stand, as she does not like Zyprexa and states it makes her 

feel tired and bloated and hungry.





Further, she feels like the hospital has betrayed her and that the staff are 

not trustworthy. She continues with psychotically disordered thoughts.





Objective





- General Observations


Appearance: Disheveled


Appears Stated Age: Yes


Stature: WNL


Posture: WNL


Eye Contact: Average, Intense


Behavior/Activity: Peculiar





- Interaction Observations


Attitude Towards Examiner: Cooperative, Anxious


Stated Mood: Dysphoric


Affect: Labile


Speech Pattern/Tone: Clear


Thought Process: Disorganized


Perception: WNL


Thought Content: Preoccupation/Ruminations, Phobic


Hallucination Type: None


Delusion Type: Persecution





- Cognitive Function


Orientation: A&O x 4


Level of Consciousness: Awake, Alert, Appropriate


Cognition: Impaired Ability to Abstract


Estimated Intelligence: Normal


Insight: Difficulty Acknowledging Presence of Psyciatric Problems


Judgment Within Normal Limits: No


Ability to Make Reasonable Decisions: Moderately Impaired





- Medication Compliance


Cooperative with Inpatient Medication Regimen: Yes





- Group Participation


Participates in Group Activities: Partial





Assessment





- Assessment


Merits Inpatient Hospitalization: For Immediate Safety


Clinical Impression: 





Lauren is a 18-year-old single white woman with no history of psychiatric 

problems but is clearly psychotic at this time who came to the hospital after 

presenting to her aunt and uncle in a fashion she never has before: speaking 

nonsense and screaming "negative" things. She has remained disorganized and 

unable to be reassured.





Plan





- Plan


Treatment Plan: 


Name: LAUREN FISH                        


YOB: 2000                        


[id number]


[id number]








We will start Zyprexa for now and potentially transition her to Abilify 

injection in the future.


The priority now is to get her rest and reduce the psychosis.


We will continue to monitor her mood and behavior and work with her family.


7/23/19


Increase dose of PRN Zyprexa and add Ativan to PRNs.


7/24/19


Pursue treatment over objection and 2PC status


7/25/19


Add Abilify to morning medications in plan to inject with Aristada should 

treatment over objection be obtained.


7/26/19


Use TOO to administer Initio, Aristada 882, and 30 mg of aripiprazole. Obtain 

pregnancy test before administration of aripiprazole products.


7/29/19


Aristada Initio, 882, and 30 mg of aripiprazole oral were administered over the 

weekend. We are now waiting for improvement.


8/2/19


As Lauren has improved at a very slow and incomplete pace, we began Zyprexa 10 

mg BID orally. She is beginning to improve more rapidly. Lauren is eager to 

leave and we have targeted a discharge date of Thursday.


8/5/19


The discharge date of Thursday is less likely as Lauren has made it clear that 

she will probably not be taking Zyprexa outpatient. We are switching to Latuda 

today and will phase out Zyprexa.


Continued Medication Management: Different Medication


Medications: 


 Current Medications





Acetaminophen (Tylenol Tab*)  650 mg PO Q4H PRN


   PRN Reason: PAIN or TEMP > 101 F


   Last Admin: 08/03/19 13:25 Dose:  650 mg


Al Hydrox/Mg Hydrox/Simethicone (Maalox Plus*)  30 ml PO Q4H PRN


   PRN Reason: INDIGESTION


Docusate Sodium (Colace Cap*)  100 mg PO BID [facility]


   Last Admin: 08/05/19 08:25 Dose:  Not Given


Lorazepam (Ativan Tab(*))  2 mg PO Q4H PRN


   PRN Reason: Anxiety/agitation


Lurasidone HCl (Latuda)  40 mg PO 1700 [facility]


Multivitamins (Theragran Tab*)  1 tab PO DAILY [facility]


   Last Admin: 08/05/19 08:25 Dose:  Not Given


Olanzapine (Zyprexa * Tab **Odt***)  5 mg PO Q4H PRN


   PRN Reason: AGITATION/ANXIETY


   Last Admin: 08/01/19 16:12 Dose:  5 mg


Olanzapine (Zyprexa *Odt*)  10 mg PO BEDTIME [facility]

## 2019-08-06 RX ADMIN — THERA TABS SCH: TAB at 08:12

## 2019-08-06 RX ADMIN — OLANZAPINE SCH MG: 10 TABLET, ORALLY DISINTEGRATING ORAL at 20:43

## 2019-08-06 RX ADMIN — DOCUSATE SODIUM SCH: 100 CAPSULE, LIQUID FILLED ORAL at 08:12

## 2019-08-06 RX ADMIN — DOCUSATE SODIUM SCH MG: 100 CAPSULE, LIQUID FILLED ORAL at 20:43

## 2019-08-07 RX ADMIN — DOCUSATE SODIUM SCH: 100 CAPSULE, LIQUID FILLED ORAL at 11:00

## 2019-08-07 RX ADMIN — THERA TABS SCH TAB: TAB at 11:00

## 2019-08-07 RX ADMIN — LURASIDONE HYDROCHLORIDE SCH MG: 40 TABLET, FILM COATED ORAL at 17:23

## 2019-08-07 RX ADMIN — DOCUSATE SODIUM SCH: 100 CAPSULE, LIQUID FILLED ORAL at 21:14

## 2019-08-07 RX ADMIN — OLANZAPINE SCH MG: 10 TABLET, ORALLY DISINTEGRATING ORAL at 21:14

## 2019-08-07 NOTE — PN
Subjective





- Subjective


Date of Service: 08/07/19


Service Type: [id number] Hosp care 25 min moderate complexity


Subjective: 





Nellie Pretty, SWAPNA, and Lauren and I spoke about discharge with Lauren. 

Although she is better than she was, her family asserts that she is not at 

baseline. Additionally, the rapid switch to Latuda from Zyprexa was not an easy 

one for Lauren and she is refusing to take the 80 mg dose that was part of the 

titration up to 120 mg.





Lauren is tearful as she realizes that she will not be leaving on Thursday. She 

is not able to return to [facility]'s house. She is considering going to 

her mother's apartment and sleeping on an air mattress until arrangements can 

be made for another housing situation.





Objective





- General Observations


Appearance: Neat


Appears Stated Age: Yes


Stature: WNL


Posture: WNL, Slumped


Eye Contact: Average


Behavior/Activity: WNL, Agitated





- Interaction Observations


Attitude Towards Examiner: Cooperative, Anxious, Confused, Defensive


Stated Mood: Dysphoric, Anxious


Affect: Labile


Speech Pattern/Tone: Clear


Thought Process: Coherent


Perception: WNL


Thought Content: WNL


Thought Process: Lethality: Paranoid Ideation


Hallucination Type: None


Delusion Type: None





- Cognitive Function


Orientation: A&O x 4


Level of Consciousness: Awake, Alert, Appropriate


Cognition: WNL


Estimated Intelligence: Normal


Insight: Difficulty Acknowledging Presence of Psyciatric Problems


Judgment Within Normal Limits: No


Ability to Make Reasonable Decisions: Mildly Impaired





- Medication Compliance


Cooperative with Inpatient Medication Regimen: Yes





- Group Participation


Participates in Group Activities: Partial





Assessment





- Assessment


Merits Inpatient Hospitalization: For Immediate Safety, For Discharge Planning


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


Clinical Impression: 





Lauren is a 18-year-old single white woman with no history of psychiatric 

problems but is clearly psychotic at this time who came to the hospital after 

presenting to her aunt and uncle in a fashion she never has before: speaking 

nonsense and screaming "negative" things. She has remained disorganized and 

unable to be reassured.





Plan





- Plan


Treatment Plan: 


Name: LAUREN FISH                        


YOB: 2000                        


[id number]


[id number]








We will start Zyprexa for now and potentially transition her to Abilify 

injection in the future.


The priority now is to get her rest and reduce the psychosis.


We will continue to monitor her mood and behavior and work with her family.


7/23/19


Increase dose of PRN Zyprexa and add Ativan to PRNs.


7/24/19


Pursue treatment over objection and 2PC status


7/25/19


Add Abilify to morning medications in plan to inject with Aristada should 

treatment over objection be obtained.


7/26/19


Use TOO to administer Initio, Aristada 882, and 30 mg of aripiprazole. Obtain 

pregnancy test before administration of aripiprazole products.


7/29/19


Aristada Initio, 882, and 30 mg of aripiprazole oral were administered over the 

weekend. We are now waiting for improvement.


8/2/19


As Lauren has improved at a very slow and incomplete pace, we began Zyprexa 10 

mg BID orally. She is beginning to improve more rapidly. Lauren is eager to 

leave and we have targeted a discharge date of Thursday.


8/5/19


The discharge date of Thursday is less likely as Lauren has made it clear that 

she will probably not be taking Zyprexa outpatient. We are switching to Latuda 

today and will phase out Zyprexa.


8/7/19


The rapid transition to Latuda failed. She is now on Latuda 40 mg and Zyprexa 

10 mg. She has now tried Risperdal, Zyprexa, Aristada and Initio, and Latuda. 

We will continue the current medication array until discharge and suggest a new 

plan to reduce the number of antipsychotics she is currently taking.


Continued Medication Management: Different Medication


Medications: 


 Current Medications





Acetaminophen (Tylenol Tab*)  650 mg PO Q4H PRN


   PRN Reason: PAIN or TEMP > 101 F


   Last Admin: 08/03/19 13:25 Dose:  650 mg


Al Hydrox/Mg Hydrox/Simethicone (Maalox Plus*)  30 ml PO Q4H PRN


   PRN Reason: INDIGESTION


Docusate Sodium (Colace Cap*)  100 mg PO BID [facility]


   Last Admin: 08/07/19 11:00 Dose:  Not Given


Hydroxyzine HCl (Atarax Tab*)  25 mg PO Q4H PRN


   PRN Reason: ANXIETY


Lorazepam (Ativan Tab(*))  2 mg PO Q4H PRN


   PRN Reason: Anxiety/agitation


Lurasidone HCl (Latuda)  40 mg PO 1700 [facility]IM[facility]


Multivitamins (Theragran Tab*)  1 tab PO DAILY [facility]


   Last Admin: 08/07/19 11:00 Dose:  1 tab


Olanzapine (Zyprexa * Tab **Odt***)  5 mg PO Q4H PRN


   PRN Reason: AGITATION/ANXIETY


   Last Admin: 08/01/19 16:12 Dose:  5 mg


Olanzapine (Zyprexa *Odt*)  10 mg PO BEDTIME [facility]


   Last Admin: 08/06/19 20:43 Dose:  10 mg











- Discharge Plan


Discharge Plan: Outpatient Follow Up


Outpatient Program: [facility]

## 2019-08-08 VITALS — SYSTOLIC BLOOD PRESSURE: 100 MMHG | DIASTOLIC BLOOD PRESSURE: 55 MMHG

## 2019-08-08 RX ADMIN — THERA TABS SCH: TAB at 09:51

## 2019-08-08 RX ADMIN — LURASIDONE HYDROCHLORIDE SCH MG: 40 TABLET, FILM COATED ORAL at 17:30

## 2019-08-08 RX ADMIN — DOCUSATE SODIUM SCH MG: 100 CAPSULE, LIQUID FILLED ORAL at 19:34

## 2019-08-08 RX ADMIN — DOCUSATE SODIUM SCH MG: 100 CAPSULE, LIQUID FILLED ORAL at 09:51

## 2019-08-08 NOTE — PN
Subjective





- Subjective


Date of Service: 08/08/19


Service Type: 80218 Hosp care 35 min high complexity


Subjective: 





Lauren is preparing for discharge. We had initially targeted the date of 

discharge for today, but for a variety of reasons the date was pushed to 

tomorrow.





Lauren is improved and in control. She is no longer psychotic yet remains 

anxious.





Conversation with her aunt Erika was difficult as Anita did not agree with 

the discharge plan to send Lauren home to her mother's house. Discussion 

contiued for some time regarding the appropriateness of discharge, yet Lauren's 

condition warrants discharge and she risks decompensation with a continued stay.





Objective





- General Observations


Appearance: Neat


Appears Stated Age: Yes


Stature: WNL


Posture: WNL


Eye Contact: Average


Behavior/Activity: WNL





- Interaction Observations


Attitude Towards Examiner: Cooperative


Stated Mood: Dysphoric


Affect: Restricted


Speech Pattern/Tone: Clear


Thought Process: Coherent


Perception: WNL


Thought Content: WNL


Hallucination Type: None


Delusion Type: None





- Cognitive Function


Orientation: A&O x 4


Level of Consciousness: Awake, Alert, Appropriate


Cognition: WNL, Impaired Attention/Concentration


Estimated Intelligence: Normal


Insight: Difficulty Acknowledging Presence of Psyciatric Problems


Judgment Within Normal Limits: Yes


Ability to Make Reasonable Decisions: Mildly Impaired





- Medication Compliance


Cooperative with Inpatient Medication Regimen: Yes





- Group Participation


Participates in Group Activities: Partial





Assessment





- Assessment


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


Clinical Impression: 





Lauren is a 18-year-old single white woman with no history of psychiatric 

problems but is clearly psychotic at this time who came to the hospital after 

presenting to her aunt and uncle in a fashion she never has before: speaking 

nonsense and screaming "negative" things. She has remained disorganized and 

unable to be reassured.





Plan





- Plan


Treatment Plan: 


Name: LAUREN FISH                        


YOB: 2000                        


[id number]


[id number]








We will start Zyprexa for now and potentially transition her to Abilify 

injection in the future.


The priority now is to get her rest and reduce the psychosis.


We will continue to monitor her mood and behavior and work with her family.


7/23/19


Increase dose of PRN Zyprexa and add Ativan to PRNs.


7/24/19


Pursue treatment over objection and 2PC status


7/25/19


Add Abilify to morning medications in plan to inject with Aristada should 

treatment over objection be obtained.


7/26/19


Use TOO to administer Initio, Aristada 882, and 30 mg of aripiprazole. Obtain 

pregnancy test before administration of aripiprazole products.


7/29/19


Aristada Initio, 882, and 30 mg of aripiprazole oral were administered over the 

weekend. We are now waiting for improvement.


8/2/19


As Lauren has improved at a very slow and incomplete pace, we began Zyprexa 10 

mg BID orally. She is beginning to improve more rapidly. Lauren is eager to 

leave and we have targeted a discharge date of Thursday.


8/5/19


The discharge date of Thursday is less likely as Lauren has made it clear that 

she will probably not be taking Zyprexa outpatient. We are switching to Latuda 

today and will phase out Zyprexa.


8/7/19


The rapid transition to Latuda failed. She is now on Latuda 40 mg and Zyprexa 

10 mg. She has now tried Risperdal, Zyprexa, Aristada and Initio, and Latuda. 

We will continue the current medication array until discharge and suggest a new 

plan to reduce the number of antipsychotics she is currently taking.


8/8/19


Family meeting today was difficult, but discharge remains planned for tomorrow.


Medications: 


 Current Medications





Acetaminophen (Tylenol Tab*)  650 mg PO Q4H PRN


   PRN Reason: PAIN or TEMP > 101 F


   Last Admin: 08/03/19 13:25 Dose:  650 mg


Al Hydrox/Mg Hydrox/Simethicone (Maalox Plus*)  30 ml PO Q4H PRN


   PRN Reason: INDIGESTION


Docusate Sodium (Colace Cap*)  100 mg PO BID [facility]


   Last Admin: 08/08/19 09:51 Dose:  100 mg


Hydroxyzine HCl (Atarax Tab*)  25 mg PO Q4H PRN


   PRN Reason: ANXIETY


   Last Admin: 08/08/19 09:53 Dose:  25 mg


Lorazepam (Ativan Tab(*))  2 mg PO Q4H PRN


   PRN Reason: Anxiety/agitation


Lurasidone HCl (Latuda)  40 mg PO 1700 [facility]


   Last Admin: 08/07/19 17:23 Dose:  40 mg


Multivitamins (Theragran Tab*)  1 tab PO DAILY [facility]


   Last Admin: 08/08/19 09:51 Dose:  Not Given


Olanzapine (Zyprexa * Tab **Odt***)  5 mg PO Q4H PRN


   PRN Reason: AGITATION/ANXIETY


   Last Admin: 08/01/19 16:12 Dose:  5 mg


Olanzapine (Zyprexa *Odt*)  10 mg PO BEDTIME [facility]


   Last Admin: 08/07/19 21:14 Dose:  10 mg

## 2019-08-09 RX ADMIN — THERA TABS SCH: TAB at 09:23

## 2019-08-09 RX ADMIN — ACETAMINOPHEN PRN MG: 325 TABLET ORAL at 13:38

## 2019-08-09 RX ADMIN — DOCUSATE SODIUM SCH: 100 CAPSULE, LIQUID FILLED ORAL at 09:23

## 2019-08-09 RX ADMIN — LURASIDONE HYDROCHLORIDE SCH MG: 40 TABLET, FILM COATED ORAL at 16:02

## 2019-08-13 NOTE — DS
DISCHARGE SUMMARY:

 

DATE OF ADMISSION:  07/20/19

 

DATE OF DISCHARGE:  08/09/19

 

PROVIDER:  Melida Reyes NP in Psychiatry.

 

SUPERVISING PHYSICIAN:  Dr. Rei Gilliam.* (DICTATED BY MELIDA REYES NP)

 

DIAGNOSIS:  Manic episode, severe with psychotic features.

 

CONDITION AT THE TIME OF DISCHARGE:  Improved, psychiatrically clear, more 
stable, participated in groups, was social with peers.  Her family is agreeable 
to her discharge and she is eager for her discharge.  She progressed well here 
psychiatrically.  She tolerated new medications well.  She agreed to an ALLISON 
under court order.  She will be attending [facility].

 

MENTAL STATUS EXAM:  At the time of discharge, Lauren is calm, cooperative, 
makes good eye contact.  She is alert and oriented x4.  Her grooming is good.  
Her speech pace is normal.  Her thought processes are logical.  She is not 
psychotic or delusional.  She denies AH, VH, SI, and HI.  Her insight and 
judgment are fair to good.  She is willing to follow up and she is urged to see 
a therapist.

 

DISCHARGE INSTRUCTIONS TO THE PATIENT: 

A.  Medications:

1.  Colace 100 mg b.i.d.

2.  Hydroxyzine 25 mg q.4 hours p.r.n. anxiety.

3.  Lorazepam 1 mg q.4 hours p.r.n. moderate anxiety.

4.  Lurasidone 40 mg with a meal daily.

5.  Olanzapine 10 mg at bedtime daily.

6.  Olanzapine ODT 5 mg q.4 hours p.r.n. severe anxiety.

7.  Therapeutic vitamin.

 

B.  Diet is regular.

 

C.  Activities are as tolerated.  Lauren is a nonsmoker.  There are no studies 
pending at the time of discharge.

 

D.  Followup care.  She has appointments with [facility] on Monday 08/12/19 at 10:45 with Shawanda Mayer with [facility].  A referral was made and the care coordinator will be contacting her.  
She has consented to participating in the CAP Cohort, which helps with 
assisting in following up with outpatient services to support your mental 
health.  As part of this Cohort, a staff from suicide prevention will be in 
contact to offer assistance with the transition from the hospital.  She has 
also been referred to [facility].  A referral was submitted to request housing support 
to [place].  The SPO coordinator Cipriano Ca will be in contact with you.

 

E.  Disposition.  Lauren is discharged to the home of her mother and with the 
support of her aunt Anita.

 

F.  Substance abuse followup is not indicated.

 

HOSPITAL COURSE: Part A:  Chief Complaint:  The patient is unable to verbalize 
anything due to deep sedation; however, she was extremely agitated, paranoid, 
and aggressive in the emergency room.

 

This 18-year-old female with no known history of treatment in the past came to 
the emergency room accompanied by her aunt and uncle due to a sudden change in 
her mental status ranging from being confused and agitated to aggressive.  At 
the time of evaluation, she was extremely sedated from her stat medication that 
she received in the emergency room, but the review of the ED report indicates 
that the patient was acting confused or responding to some unseen stimuli.  She 
was very fearful to come into the emergency room.  She was also fearful to come 
into her room, rather she stayed outside in the waiting area.  She was very 
fearful to go inside because of the demons in there.  Her aunt and uncle also 
reported the same thing that for the last 3 nights Lauren is not sleeping rather 
fighting with the demons and trying to get them out of the house.  She was 
doing the exact same thing in the emergency room.  At one point, ED doctors had 
to give her 2 mg of lorazepam and 20 mg of Geodon IM to control her agitation 
and aggression.  At this point, she is deeply sedated.  We did try to get her 
vital signs, but she would not allow.  Finally, the vital signs were taken, 
which appears to be low blood pressure.  Her blood pressure at this time is 83/
58, pulse rate varies between 75 to 110, breathing is shallow but normal, 
temperature is 98.2, O2 sat is 99% on room air.  Hospitalist, Dr. Beverly was 
called.  He advised to repeat the vital signs, which we did and did not have 
much difference except the pulse rate went up.  I also called the lab to see if 
they have the blood.  They said they did not have the blood, so they are on 
their way to obtain a blood and urine sample and other labs that were ordered 
in the emergency room.  According to the patient's aunt and uncle, Lauren broke 
up with her boyfriend of 3 years and moved out of the apartment in the recent 
past and since then there was a change in her mental status.  The rest of the 
history is unavailable because the patient is unable to provide any history and 
there are no family members available; however, collateral from aunt and uncle 
indicates that a month ago Lauren said she feels real spirits around her and 
sees relatives as well. Other than that, there is not much of a psychiatric 
history.

 

Part B:  Psychiatric treatment was rendered.  Lauren was admitted to the Adult 
Behavioral Unit and placed on 15-minute checks for safety.  She did well on the 
unit eventually, although she struggled for a long time feeling paranoid and 
afraid of everyone, often asking for reassurance, in fact asking for 
reassurance so often that it became difficult to understand what she was afraid 
of.  Eventually toward the end of her stay, she interacted with peers well.  
She did tolerate medication additions.  We started with Zyprexa and attempted 
to transition her to have an Abilify injection as I believed there was a larger 
mood component to her illness. We were monitoring her mood and behavior and 
working with her family.  On 07/23/19, we increased the dose of p.r.n. Zyprexa 
and added Ativan to her PRNs.  On 07/24/19, we pursued treatment over objection 
and a 2PC status.

 

On 07/25/19, we added Abilify for the morning medications in order to plan to 
inject with Aristada and Aristada Initio, should treatment objection be 
obtained which it was.  On 07/26/19, we attempted to administer Initio Aristada 
882 and 30 mg of aripiprazole.  A pregnancy test could not be obtained because 
a blood sample was not obtained and that slowed the process of administering 
the Aristada.  Over that weekend, Aristada was administered with great distress 
on Laurne's part.  She had to be restrained.  She did not remember that we had 
discussed her receiving an injectable.

 

On 07/27/19, Aristada Initio, Aristada 882, and 30 mg of aripiprazole oral were 
administered over the weekend and we were waiting for improvement.  On 08/02/19
, we noted that Lauren improved at a very slow and incomplete pace.  We began 
Zyprexa 10 mg b.i.d. orally.  She is beginning to improve more rapidly.  Lauren 
is eager to leave and we have targeted discharge date of Thursday.  08/05/19, 
the discharge date of Thursday is less likely as Lauren has made it clear that 
she will probably not be taking Zyprexa outpatient.  We are switching to Latuda 
today doing a rapid up taper and phasing out Zyprexa.

 

On 08/07/19, the rapid transition to Latuda failed.  She is now on Latuda 40 
and Zyprexa 10 mg at bedtime.  She has now tried Risperdal, Zyprexa, Aristada 
Initio, Aristada and Latuda.  She also received Geodon injection in the ER.  We 
will consider the current medication array until discharge and suggest a new 
plan to reduce the number of antipsychotics she is currently taking.  On 08/09/
19, a family meeting was difficult as the discharge plan changed at what felt 
like the last minute to the family, yet we retained the plan for 08/09/19.

 

We did try to change Lauren from taking a total of 20 mg of Zyprexa to taking 
Latuda.  We did not have a lot of time.  The rapid taper titration up was 
unsuccessful as she got a terrible headache and refused to take a higher dose 
of Latuda again.  The olanzapine is not the best idea for Lauren.  She has 
gained between 9 and 12 pounds on her stay on the unit and complains of being 
hungry all the time.  Nevertheless, she is tolerating the medication well.  I 
was considering using Vraylar for her, but that is not available on our 
formulary.

 

Lauren's labs looked quite good.  Her hemoglobin A1c is 5.4.  Her triglycerides 
are 51, cholesterol was 133, LDL cholesterol was 58, HDL cholesterol was 65, 
and her TSH was 0.81.  These labs were done on 07/27/19.

 

We had many meetings with her family.  Her mother Helga is very concerned 
about her, but is also not well equipped to care for Lauren due to mental 
illness.  Her aunt Anita is better equipped emotionally and financially, but 
is unable to care for her due to constraints on her own life.  Lauren was 
discharged home to her mother.  She was considered stable enough as she had 
housing and food and access to medications to leave.  Lauren was incredibly 
dysphoric when she was on the unit toward the end of her stay, almost begging 
to leave the unit and forcing smiles and being overly compliant.  It was 
considered that if Lauren stayed over the weekend she could decompensate due to 
her inability to cope with being in the hospital any longer.  Indeed, she did 
stay 20 days.

 

There were no consults entered for Lauren, although she did have specific needs 
regarding nutrition.  She is much improved over when she arrived here at the 
hospital.  She struggled significantly to maintain her composure and also 
struggled to get well, but in the end although she appeared shaky, she was also 
not psychotic and not a danger to herself upon discharge.

 

____________________________________ MELIDA REYES, RIDDHI

 

[id number]/CPS #: [id number]

INDER

## 2019-10-03 NOTE — HP
ADDENDUM TO HISTORY AND PHYSICAL

 

Note:  The original history and physical was dictated by Dr. Becky Evans.  The date of that dicta
tion was 07/20/2019 and it was transcribed that same day.

 

DATE OF ADMISSION:  07/20/2019.

 

PAST MEDICAL HISTORY:  Significant only for constipation.

 

REVIEW OF SYSTEMS:  The patient denied headache or double vision.  She denied sore throat, cough, denzel
st pain, difficulty breathing, abdominal pain, nausea, vomiting, diarrhea, or constipation.  She rene
ed difficulty ambulating, enlarged lymph nodes, fevers, rashes, or changes in weight.

 

                               PHYSICAL EXAMINATION

 

VITAL SIGNS:  Blood pressure 130/88, heart rate 100, respiratory rate 20, temperature 99.0 degrees Fa
hrenheit, oxygen saturation 99 percent on room air.

 

HEENT:  Head normocephalic, atraumatic.

 

NECK:  Supple.

 

CHEST:  Clear to auscultation bilaterally.

 

CARDIAC:  Exam reveals normal heart sounds.

 

ABDOMEN:  Soft and nontender.

 

SKIN:  Warm and dry.

 

MUSCULOSKELETAL:  Exam reveals no sign of edema.

 

NEUROLOGIC:  She is grossly intact with no focal deficits.

 

 MENTAL STATUS EXAMINATION:  The patient is a young, white female, dressed in hospital scrubs who mare
ears sedated, confused and agitated at times, somewhat uncooperative.  Speech is slurred.  Mood appea
rs to be agitated with a labile affect.  Thought process is disorganized.  Thought content significan
t for fear of demons.  She is denying suicidal or homicidal ideations.  She does appear to be respond
ing to internal stimuli.  Insight and judgment are markedly impaired given her agitated and destructi
ve behavior.  Cognitively, she is sedated likely secondary to stat medications.

 

 

 

[id number]/CPS #: [id number]

## 2019-11-07 ENCOUNTER — HOSPITAL ENCOUNTER (INPATIENT)
Dept: HOSPITAL 25 - ED | Age: 19
LOS: 1 days | Discharge: HOME | DRG: 753 | End: 2019-11-08
Attending: PSYCHIATRY & NEUROLOGY | Admitting: PSYCHIATRY & NEUROLOGY
Payer: COMMERCIAL

## 2019-11-07 DIAGNOSIS — F41.0: ICD-10-CM

## 2019-11-07 DIAGNOSIS — T43.595A: ICD-10-CM

## 2019-11-07 DIAGNOSIS — R45.851: ICD-10-CM
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
## 2019-11-07 DIAGNOSIS — F43.10: ICD-10-CM

## 2019-11-07 DIAGNOSIS — F31.30: Primary | ICD-10-CM

## 2019-11-07 DIAGNOSIS — G25.71: ICD-10-CM

## 2019-11-07 DIAGNOSIS — Z79.899: ICD-10-CM

## 2019-11-07 LAB
ALBUMIN SERPL BCG-MCNC: 4.5 G/DL (ref 3.2–5.2)
ALBUMIN/GLOB SERPL: 1.5 {RATIO} (ref 1–3)
ALP SERPL-CCNC: 51 U/L (ref 34–104)
ALT SERPL W P-5'-P-CCNC: 9 U/L (ref 7–52)
ANION GAP SERPL CALC-SCNC: 5 MMOL/L (ref 2–11)
APAP SERPL-MCNC: < 15 MCG/ML
AST SERPL-CCNC: 15 U/L (ref 13–39)
BASOPHILS # BLD AUTO: 0.1 10^3/UL (ref 0–0.2)
BUN SERPL-MCNC: 12 MG/DL (ref 6–24)
BUN/CREAT SERPL: 17.4 (ref 8–20)
CALCIUM SERPL-MCNC: 9.6 MG/DL (ref 8.6–10.3)
CHLORIDE SERPL-SCNC: 108 MMOL/L (ref 101–111)
EOSINOPHIL # BLD AUTO: 0.1 10^3/UL (ref 0–0.6)
GLOBULIN SER CALC-MCNC: 3.1 G/DL (ref 2–4)
GLUCOSE SERPL-MCNC: 97 MG/DL (ref 70–100)
HCO3 SERPL-SCNC: 26 MMOL/L (ref 22–32)
HCT VFR BLD AUTO: 38 % (ref 35–47)
HGB BLD-MCNC: 12.5 G/DL (ref 12–16)
LYMPHOCYTES # BLD AUTO: 1.8 10^3/UL (ref 1–4.8)
MCH RBC QN AUTO: 27 PG (ref 27–31)
MCHC RBC AUTO-ENTMCNC: 33 G/DL (ref 31–36)
MCV RBC AUTO: 82 FL (ref 80–97)
MONOCYTES # BLD AUTO: 0.3 10^3/UL (ref 0–0.8)
NEUTROPHILS # BLD AUTO: 5.5 10^3/UL (ref 1.5–7.7)
NRBC # BLD AUTO: 0 10^3/UL
NRBC BLD QL AUTO: 0
PLATELET # BLD AUTO: 301 10^3/UL (ref 150–450)
POTASSIUM SERPL-SCNC: 4 MMOL/L (ref 3.5–5)
PROT SERPL-MCNC: 7.6 G/DL (ref 6.4–8.9)
RBC # BLD AUTO: 4.61 10^6 /UL (ref 3.7–4.87)
RBC UR QL AUTO: (no result)
SALICYLATES SERPL-MCNC: < 2.5 MG/DL (ref ?–30)
SODIUM SERPL-SCNC: 139 MMOL/L (ref 135–145)
TSH SERPL-ACNC: 0.94 MCIU/ML (ref 0.34–5.6)
WBC # BLD AUTO: 7.7 10^3/UL (ref 3.5–10.8)

## 2019-11-07 PROCEDURE — 80307 DRUG TEST PRSMV CHEM ANLYZR: CPT

## 2019-11-07 PROCEDURE — 81003 URINALYSIS AUTO W/O SCOPE: CPT

## 2019-11-07 PROCEDURE — 84443 ASSAY THYROID STIM HORMONE: CPT

## 2019-11-07 PROCEDURE — 99284 EMERGENCY DEPT VISIT MOD MDM: CPT

## 2019-11-07 PROCEDURE — 80329 ANALGESICS NON-OPIOID 1 OR 2: CPT

## 2019-11-07 PROCEDURE — 85025 COMPLETE CBC W/AUTO DIFF WBC: CPT

## 2019-11-07 PROCEDURE — 81015 MICROSCOPIC EXAM OF URINE: CPT

## 2019-11-07 PROCEDURE — 80053 COMPREHEN METABOLIC PANEL: CPT

## 2019-11-07 PROCEDURE — 36415 COLL VENOUS BLD VENIPUNCTURE: CPT

## 2019-11-07 PROCEDURE — 80320 DRUG SCREEN QUANTALCOHOLS: CPT

## 2019-11-07 PROCEDURE — 99238 HOSP IP/OBS DSCHRG MGMT 30/<: CPT

## 2019-11-07 PROCEDURE — G0480 DRUG TEST DEF 1-7 CLASSES: HCPCS

## 2019-11-07 NOTE — ED
Psychiatric Complaint





- HPI Summary


HPI Summary: 





Patient is a 19-year-old  female with a past medical history of 

bipolar disorder with psychosis who comes in today with a chief complaint of 

"feeling more depressed recently".  She states recently she's been having 

suicidal ideation and thoughts of hurting herself.  She states she does not 

have a plan.  She states that "her body feels terrible and she feels so tired 

and "She currently had a medication change but does not credit her symptoms 

today with this change.  She lives at home with mom and sees a counselor 

recommended regularly. She states that seeing a counselor does help with her 

symptoms but is seeking further help today.  She denies past attempts at 

suicide or self-harm.  Patient endorses difficulty sleeping and loss of 

appetite recently.  Patient patient denies any physical pain, fever, chest pain

, shortness breath, abdominal pain, pain with urination.





- History Of Current Complaint


Chief Complaint: EDMentalHealth


Time Seen by Provider: 11/07/19 13:40


Hx Obtained From: Patient, Family/Caretaker - Mother and counselor present


Hx Last Menstrual Period: 12/28/16


Onset/Duration: Other - This is a chronic problem


Timing: Constant


Severity Initially: Moderate


Severity Currently: Moderate


Character: Depressed


Aggravating Factor(s): Recent Stress


Alleviating Factor(s): Counseling


Associated Signs And Symptoms: Positive: Sleep Disturbance - Endorses 

difficulty sleeping for the last few days, Appetite Change - Endorses decreased 

appetite over the last few days


Related History: Positive For: Prior Psychiatric Issues - Bipolar disorder with 

psychosis


Has Suicidal: Reports: Thoughts.  Denies: With A Plan, Demonstrates Gesture, 

Has Prior Attempt(s)


Has Homicidal: Denies: Thoughts


Recent Stressor(s): "life"





- Risk Factor(s)


Completed Suicide Risk Factors: Negative





- Allergies/Home Medications


Allergies/Adverse Reactions: 


 Allergies











Allergy/AdvReac Type Severity Reaction Status Date / Time


 


No Known Allergies Allergy   Verified 11/07/19 13:36











Home Medications: 


 Home Medications





ARIPiprazole [Aripiprazole] 20 mg PO DAILY 11/07/19 [History Confirmed 11/07/19]


cloNIDine TAB* [Catapres 0.1 MG TAB*] 0.1 mg PO BID PRN MDD 2 tabs 11/07/19 [

History Confirmed 11/07/19]











PMH/Surg Hx/FS Hx/Imm Hx


GI History: Reports: Other GI Disorders - bloating/feeling of fullness, pain


Sensory History: 


   Denies: Hx Contacts or Glasses, Hx Hearing Aid


Opthamlomology History: 


   Denies: Hx Contacts or Glasses


Psychiatric History: Reports: Hx Anxiety, Hx Depression, Hx Panic Disorder, Hx 

Post Traumatic Stress Disorder, Hx Community Mental Health Tx - therapy in 

adolescence when parents 


   Denies: Hx Inpatient Treatment, Hx of Violent Episodes Against Others





- Surgical History


Surgery Procedure, Year, and Place: Had an endoscopy years ago, normal results.


Infectious Disease History: No


Infectious Disease History: 


   Denies: Traveled Outside the [place] in Last 30 Days





- Family History


Known Family History: Positive: Other - Schizophrenia, mother


   Negative: Cardiac Disease, Hypertension, Diabetes





- Social History


Alcohol Use: None


Alcohol Amount: "not a lot"


Substance Use Type: Reports: None


Substance Use Comment - Amount & Last Used: occasional use


Smoking Status (MU): Never Smoked Tobacco





Review of Systems


Constitutional: Negative


Eyes: Negative


Cardiovascular: Negative


Respiratory: Negative


Gastrointestinal: Negative


Genitourinary: Negative


Skin: Negative


Neurological: Negative


Positive: Depressed


All Other Systems Reviewed And Are Negative: Yes





Physical Exam


Triage Information Reviewed: Yes


Vital Signs On Initial Exam: 


 Initial Vitals











Temp Pulse Resp BP Pulse Ox


 


 98.4 F   82   15   128/83   100 


 


 11/07/19 13:32  11/07/19 13:32  11/07/19 13:32  11/07/19 13:32  11/07/19 13:32











Vital Signs Reviewed: Yes


Appearance: Positive: Well-Appearing, No Pain Distress, Well-Nourished


Skin: Positive: Warm, Skin Color Reflects Adequate Perfusion


Head/Face: Positive: Normal Head/Face Inspection


Eyes: Positive: Normal, EOMI


ENT: Positive: Hearing grossly normal


Respiratory/Lung Sounds: Positive: Clear to Auscultation, Breath Sounds Present


Cardiovascular: Positive: Normal, RRR, S1, S2


Abdomen Description: Positive: Nontender


Bowel Sounds: Positive: Present


Neurological: Positive: Normal Gait, Speech Normal


Psychiatric: Positive: Affect/Mood Appropriate - Flat affect, poor eye contact 

during, Depressed


AVPU Assessment: Alert - the interview.





Procedures





- Sedation


Patient Received Moderate/Deep Sedation with Procedure: No





Diagnostics





- Vital Signs


 Vital Signs











  Temp Pulse Resp BP Pulse Ox


 


 11/07/19 13:32  98.4 F  82  15  128/83  100














- Laboratory


Lab Results: 


 Lab Results











  11/07/19 Range/Units





  14:31 


 


WBC  7.7  (3.5-10.8)  10^3/uL


 


RBC  4.61  (3.70-4.87)  10^6 /uL


 


Hgb  12.5  (12.0-16.0)  g/dL


 


Hct  38  (35-47)  %


 


MCV  82  (80-97)  fL


 


MCH  27  (27-31)  pg


 


MCHC  33  (31-36)  g/dL


 


RDW  16 H  (10-15)  %


 


Plt Count  301  (150-450)  10^3/uL


 


MPV  8.7  (7.4-10.4)  fL


 


Neut % (Auto)  71.7  %


 


Lymph % (Auto)  23.0  %


 


Mono % (Auto)  3.8  %


 


Eos % (Auto)  0.8  %


 


Baso % (Auto)  0.7  %


 


Absolute Neuts (auto)  5.5  (1.5-7.7)  10^3/ul


 


Absolute Lymphs (auto)  1.8  (1.0-4.8)  10^3/ul


 


Absolute Monos (auto)  0.3  (0-0.8)  10^3/ul


 


Absolute Eos (auto)  0.1  (0-0.6)  10^3/ul


 


Absolute Basos (auto)  0.1  (0-0.2)  10^3/ul


 


Absolute Nucleated RBC  0.0  10^3/ul


 


Nucleated RBC %  0.0  











Result Diagrams: 


 11/07/19 14:31





 11/07/19 14:31


Lab Statement: Any lab studies that have been ordered have been reviewed, and 

results considered in the medical decision making process.





Course/Dx





- Course


Course Of Treatment: Patient was evaluated in the emergency department for the 

chief complaint of suicidal ideation and feeling depressed.  Patient was seen 

and evaluated.  Patient was met with staff upon arrival to the emergency room 

she was placedfrom for evaluation.  Patient was change in up describes, a 

urinalysis was obtained and blood work was obtained.  Lab results were 

unconcerning and showed no signs of acute medical problems.  The patient was 

cleared for mental health evaluation.  During her stay in the emergency 

department she was on constant observation and her mother and counselor were in 

the room.  After medical clearance she was sent to the ED annex for evaluation.

  Patient was seen by Dr. Greer (psychiatrist) and will be admitted to the 

behavioral health unit as a voluntary admission with a diagnosis of bipolar 

disorder.  Patient was stable and well mannered during her time in the 

emergency department.  She made no gestures of self-harm on the emergency 

department.  Patient was under observation during her entire time in the 

emergency department.





- Differential Dx/Clinical Impression


Differential Diagnosis/HQI/PQRI: Positive: Acute Psychosis, Anxiety, Bipolar 

Disorder, Depression, Suicidal Ideation


Provider Diagnosis: 


 Bipolar disorder, unspecified








- Physician Notifications


Discussed Care Of Patient With: Kervin Stuart - psychiatrist


Time Discussed With Above Provider: 16:58


Instructed by Provider To: Admit As Inpatient - Voluntary mental health 

admission


Patient Is Medically Stable For: Psych Evaluation


Admit/Transition Orders Completed By ED Provider: No





Discharge ED





- Sign-Out/Discharge


Documenting (check all that apply): Patient Departure





- Discharge Plan


Condition: Stable


Disposition: ADMITTED TO [facility]





- Billing Disposition and Condition


Condition: STABLE


Disposition: Admitted to [facility]





- Attestation Statements


Provider Attestation: 





pt seen by midlevel provider independently, based on their assessment, it was 

not necessary to present the case to me but I was available for consultation. I 

did not form a physician-patient relationship with the patient. The chart 

however, has been reviewed. am signing this note strictly in an administrative 

capacity.

## 2019-11-08 VITALS — DIASTOLIC BLOOD PRESSURE: 69 MMHG | SYSTOLIC BLOOD PRESSURE: 121 MMHG

## 2019-11-08 NOTE — HP
CC:  Riya oRdriguez *

 

HISTORY AND PHYSICAL AND DISCHARGE SUMMARY:

 

DATE OF ADMISSION:  11/07/19

 

DATE OF DISCHARGE:  11/08/19

 

PROVIDER:  Melida Oneal NP, in Psychiatry.

 

SUPERVISING PHYSICIAN:  Rei Gilliam MD * (DICTATED BY MELIDA ONEAL NP
)

 

JUSTIFICATION FOR ADMISSION:  The patient is in need of 24-hour supervision and 
care secondary to suicidal ideation and self-injurious behavior.

 

CHIEF COMPLAINT:  "Restlessness was getting to me... I was getting overwhelmed.
"
 

HISTORY OF PRESENT ILLNESS:  The patient is a 19-year-old single,  
female with a history of bipolar I disorder, most recent episode depressed, who 
arrives brought in by family and is here on a voluntary status following her 
inner feeling of physical restlessness that has been going on for months and 
became worse and made her feel overwhelmed and as if she wanted to die because 
she could not take it any longer.

 

Lauren was admitted to the behavioral sciences unit in July and was there for a 
long stay into the month of August.  At that time, she was experiencing a mood 
episode as well as psychosis and received the diagnosis of bipolar I disorder, 
current episode mixed with psychotic features.  At this time, she is depressed.
  She feels quite miserable due to what she terms restlessness that would be 
more accurately described as akathisia.

 

Lauren seems to have established a relatively happy or content life living with 
her mom and working on and off at [employer], but not enjoying it the job.  She 
also recently received a Section 8 voucher, which promises that she will be 
living on her own soon, which is very appealing to her.

 

She is having a hard time sleeping.  She is irritable and she feels bad in 
general and she feels bad about feeling bad.  Her energy is lower.  She cannot 
concentrate. She feels apathetic.  She is constantly, when she is seated, 
moving her legs gently up and down or touching them with fist against her 
thighs.

 

PAST PSYCHIATRIC HISTORY:  She has been admitted here once at [facility] for psychiatric reasons from 07/20/19 until 08/09/19.  She is currently 
taking Abilify 20 mg and clonidine 0.1 mg b.i.d.  She says she does not hate it
, but she also is quite unhappy and she has not made the connection that 
Abilify is most likely the agent causing her to feel such akathisia that it is 
causing her to want to end her life.

 

PAST MEDICAL HISTORY:  Significant only for constipation.  It should be noted 
that while she was in the hospital in July and August, she was administered 
Risperdal, Geodon, Abilify Maintena, Latuda, and olanzapine as well as Ativan.

 

FAMILY HISTORY:  Mom has schizophrenia.  Dad has anxiety and in anger 
management problem.

 

SUBSTANCE ABUSE HISTORY:  Denied.  She does not smoke.  She does not drink 
alcohol. She does not use drugs.

 

SOCIAL HISTORY:  She was born in [place], but immigrated here when she was a 
child. She graduated from [employer] and has not gone to college.  She 
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 
not enjoy that. She does not know what else she wants to do.  She is currently 
living with her mother.  That is an acceptable situation, but it is more 
desirable for her to live on her own as her mother is quite psychiatrically ill 
and causes Lauren some anxiety.  She did recently get a Section 8 voucher.  She 
anticipates that in the next 2 to 3 months, she will be finding her own place 
to live.  She has no  history.  She has no legal problems.

 

REVIEW OF SYSTEMS:  The patient reports feeling fatigued.  She denies shortness 
of breath, heat or cold intolerance, chest pain, or abdominal pain.  She denies 
neurological symptoms.  She denies fevers or changes in weight.

 

                               PHYSICAL EXAMINATION

 

VITAL SIGNS:  On 11/08/19, at 0800, temperature is 99.1, pulse 86, respirations 
16, O2 sat on room air 100%, blood pressure 121/69.

 

GENERAL APPEARANCE:  Well appearing, no pain, distress, well nourished.

 

HEAD AND FACE:  Normal head and face inspection.  Eyes:  Normal EOMI.

 

ENT:  Hearing grossly intact.

 

LUNGS:  Respirations, lung sounds clear to auscultation.  Breath sounds present.

 

CARDIOVASCULAR:  Normal RRR.  S1, S2 present.

 

ABDOMEN:  Description is nontender.  Bowel sounds present.

 

NEUROLOGICAL:  Normal gait.  Speech normal.

 

SKIN:  Warm.  Skin color reflects adequate perfusion.

 

 MENTAL STATUS EXAM:  Lauren is approximately 5 feet 4 inches and weighs 155 
pounds. She has long dark hair and is an attractive young woman.  Her grooming 
is good. She is indeed restless, moving her legs while we were sitting and 
pacing when we were not talking and she is on her own.  She is calm and 
cooperative, though she appears very sad and she seems to struggle to make eye 
contact.  Her speech has a normal tone, rate, and volume.  She is dysthymic.  
She has a restricted affect. Her thought processes are normal.  Her thought 
content is free of delusions.  She is no longer suicidal, although she was at 
admission.  She is not homicidal.  She is not hallucinating.  Her insight is 
good.  Her judgment is good.  She is alert and oriented x4.

 

LABORATORY DATA:  Most data are within normal limits exceptions include RDW 
high at 16.  It should be noted that her hemoglobin A1c on 07/27/19 was 5.4.  
Her triglycerides were 51, cholesterol 133, LDL cholesterol 58, HDL cholesterol 
65.  On 11/07/19, her TSH was normal at 0.94.  Urine contained 3+ blood, 2+ rbc'
s, and present urine squamous epithelial cells.  Her toxicology screen shows no 
drugs of abuse.

 

DIAGNOSIS:  Bipolar I disorder, current episode depressed.

 

IMPRESSION:  Lauren is a 19-year-old single female who comes to the hospital 
after becoming extremely depressed by the phenomenon of akathisia, which she 
did not understand could be helped and in that context, she wanted to end her 
life.

 

PLAN:  The patient is admitted to the adult behavioral health unit and placed 
on q.15 minute checks for her own safety.  She is encouraged to participate in 
supportive milieu, individual, and group therapies.  Her estimated length of 
stay is 1 to 3 days.  We will titrate medications to efficacy and monitor for 
mood and thought content.  Discharge planning will include family involvement 
and outpatient providers.

 

CONDITION AT THE TIME OF DISCHARGE:  Lauren is improved.  She is psychiatrically 
cleared and stable.  She did not participate in groups, but she was mildly 
social with peers.  Her uncle agrees to pick her up and is agreeable to 
discharge.  Lauren is eager for discharge.  She has done well here 
psychiatrically.  She tolerated the addition of Ativan well and it will be 
converted to Klonopin in the outpatient setting.  She will be following up with 
Jennifer.

 

MENTAL STATUS EXAM:  At the time of discharge, Lauren is calm, cooperative, and 
makes good eye contact, which is much improved over admission.  She is alert 
and oriented x4.  Her grooming remains good.  Her speech pace is normal.  Her 
thought processes are logical.  She is not psychotic or delusional.  She denies 
AH, VH, SI, and HI.  Her insight and judgment are both good.  She is willing to 
follow up and she is urged to see a therapist and consult with her nurse 
practitioner.

 

DISCHARGE INSTRUCTIONS TO THE PATIENT:  

A:  Medications:

1.  Aripiprazole 20 mg daily.

2.  Clonidine 0.1 mg b.i.d.

3.  Clonazepam 1 mg p.o. t.i.d. p.r.n. akathisia/restlessness, maximum daily 
dose is 3 mg.

 

It should be noted that Lauren was advised strongly not to make taking Klonopin 
a permanent habit, in fact she was noted to agree that she did not want to 
endure addiction or tolerance or dependence issues.  She was eager to solve the 
problem, which likely stems from aripiprazole and its tendency to cause 
akathisia.  She was encouraged to take as little clonazepam as possible, so she 
was encouraged to take only 2, and a third if she needed it, daily.

 

B:  Diet is regular.

 

C:  Activity is as tolerated.  Lauren is a nonsmoker.  There are no studies 
pending at the time of discharge.

 

D:  Followup care:  She has an appointment on 11/14/19 with Jennifer.  Her 
team at [facility] has been notified of her visit here and her paperwork will be 
sent to them so they understand what has happened.

 

E:  Disposition:  She is being discharged home to her apartment with her mother.

 

F:  Substance abuse followup is not indicated.

 

HOSPITAL COURSE:  Psychiatric treatment was rendered.  Lauren was admitted to 
the adult behavioral health unit and placed on 15-minute checks for her own 
safety. She did well on the unit in that she was comfortable enough to ask 
questions and acquire things she needed.  She tended not to go to groups, but 
she was on the unit for a very short time.  She interacted with peers 
appropriately.

 

When we met, it was quickly ascertained that she was experiencing akathisia.  
We started with 0.5 mg of Ativan, which when I checked in about half an hour 
later was somewhat better.  She felt slightly less restless and she stated that 
her brain felt more relaxed. Another 0.5 mg of Ativan administered.  She stated 
she felt significantly better and when I asked which she liked to be on 0.5 or 
1 mg going forward, she indicated that 1 mg was much more satisfactory and that 
that is what she would like to continue with in the outpatient setting.  
Because Ativan has a short half life, we switched to Klonopin. She is advised 
to take it twice in a day, perhaps at 8 in the morning and 4 in the afternoon.  
She is offered an additional dose of Klonopin daily in case her akathisia is 
worse that day.  She is also [facility] if [facility] 
is another medication she can use that will not cause such severe akathisia.

 

I did briefly meet with her uncle, who is in support of her coming home.  No 
consults were entered for her.  She is much improved.  When I met with her at 
first, she immediately stated to me "I have made a mistake, I don't need to be 
here."  She also entered her 72-hour notice. Thus working with Lauren's 
intention to take care of herself and make her own paths forward, we agree to 
discharge her in the early afternoon on Friday, 11/08/19.

 

Her suicidal thoughts went away with at least partial resolution of her 
restless feelings.  She was offered education regarding the source of that 
restlessness and she was very appreciative and relieved.  Her eye contact 
improved.  She offered some smiles and was eager to go home.

 

 ____________________________________ MELIDA ONEAL, RIDDHI

 

[id number]/[facility] #: [id number]

INDER

## 2023-04-26 NOTE — PROCNOTE
- Assessment for Patient Restraint


Evaluation of the Patient's Immediate Situation: 


ON-CALL PSYCHIATRIST NOTE





Called to evaluate this patient was manually restrained from 11:20 to 11:29AM 

for court-ordered IM medications for treatment of psychosis.


Prior to the administration of IM meds, she was intrusive, disruptive to the 

milieu, ignored many verbal attempts to redirect her, refused court-ordered PO 

meds, became increasingly agitated and yelled delusional comments to peers. 








Patient's Reaction to Intervention: 


Patient is out in the milieu, remains disorganized in her thinking but he is 

calmer, in no acute medical distress, her vital signs are stable. 





Patient's Medication and Behavioral Condition: 


 Aristada initio 675 mg IM x 1


Aristada 882 mg IM x1


Aripiprazole 30 mg PO x 1


Zyprexa 10mg IM for psychotic agitation.  


Evaluate Need for Continued Restraint: Terminate
No

## 2025-07-09 NOTE — PN
Subjective





- Subjective


Date of Service: 07/26/19


Service Type: 13443 Hosp care 15 min low complexity


Subjective: 





Lauren's case went to the court today and the hospital won the right to 

treatment over objection. Lauren did not attend court. 





Lauren remaiined difficult to console and she was not able to be convinced that 

she would be safe in the hospital. She did advocate to be released. She remains 

disorganized and continues to require close monitoring and near constant 

reassurance.





Objective





- General Observations


Appearance: Disheveled


Appears Stated Age: Yes


Stature: WNL


Posture: WNL


Eye Contact: Intense


Behavior/Activity: Peculiar, Impulsive, Agitated





- Interaction Observations


Attitude Towards Examiner: Anxious, Confused


Stated Mood: Dysphoric, Anxious


Affect: Labile


Speech Pattern/Tone: Clear


Thought Process: Disorganized


Perception: WNL


Thought Content: Paranoid


Thought Process: Lethality: Paranoid Ideation


Hallucination Type: Auditory


Delusion Type: Persecution





- Cognitive Function


Orientation: Person, Place, Time


Level of Consciousness: Awake, Alert


Cognition: Impaired Attention/Concentration


Estimated Intelligence: Normal


Insight: Difficulty Acknowledging Presence of Psyciatric Problems


Judgment Within Normal Limits: Yes


Ability to Make Reasonable Decisions: Serverely Impaired





- Medication Compliance


Cooperative with Inpatient Medication Regimen: Partial





- Group Participation


Participates in Group Activities: Partial





Assessment





- Assessment


Merits Inpatient Hospitalization: For Immediate Safety


Clinical Impression: 





Lauren is a 18-year-old single white woman with no history of psychiatric 

problems but is clearly psychotic at this time who came to the hospital after 

presenting to her aunt and uncle in a fashion she never has before: speaking 

nonsense and screaming "negative" things. She has remained disorganized and 

unable to be reassured.





Plan





- Plan


Treatment Plan: 


Name: LAUREN FISH                        


YOB: 2000                        


[id number]


[id number]








We will start Zyprexa for now and potentially transition her to Abilify 

injection in the future.


The priority now is to get her rest and reduce the psychosis.


We will continue to monitor her mood and behavior and work with her family.


7/23/19


Increase dose of PRN Zyprexa and add Ativan to PRNs.


7/24/19


Pursue treatment over objection and 2PC status


7/25/19


Add Abilify to morning medications in plan to inject with Aristada should 

treatment over objection be obtained.


7/26/19


Use TOO to administer Initio, Aristada 882, and 30 mg of aripiprazole. Obtain 

pregnancy test before administration of aripiprazole products.


Continued Medication Management: Different Medication


Medications: 


 Current Medications





Acetaminophen (Tylenol Tab*)  650 mg PO Q4H PRN


   PRN Reason: PAIN or TEMP > 101 F


Al Hydrox/Mg Hydrox/Simethicone (Maalox Plus*)  30 ml PO Q4H PRN


   PRN Reason: INDIGESTION


Lorazepam (Ativan Tab(*))  1 mg PO Q4H PRN


   PRN Reason: ANXIETY


   Last Admin: 07/25/19 06:40 Dose:  1 mg


Multivitamins (Theragran Tab*)  1 tab PO DAILY DIMAS


   Last Admin: 07/26/19 08:23 Dose:  Not Given


Olanzapine (Zyprexa Tab*)  10 mg PO Q6H PRN


   PRN Reason: AGITATION/ANXIETY


   Last Admin: 07/25/19 18:19 Dose:  10 mg This was a shared visit with the CHANDA. I reviewed and verified the documentation.